# Patient Record
Sex: MALE | Race: WHITE | Employment: OTHER | URBAN - METROPOLITAN AREA
[De-identification: names, ages, dates, MRNs, and addresses within clinical notes are randomized per-mention and may not be internally consistent; named-entity substitution may affect disease eponyms.]

---

## 2017-02-28 ENCOUNTER — OFFICE VISIT (OUTPATIENT)
Dept: DERMATOLOGY | Age: 67
End: 2017-02-28

## 2017-02-28 DIAGNOSIS — L81.4 LENTIGO: ICD-10-CM

## 2017-02-28 DIAGNOSIS — L30.9 DERMATITIS: Primary | ICD-10-CM

## 2017-02-28 DIAGNOSIS — L57.0 AK (ACTINIC KERATOSIS): ICD-10-CM

## 2017-02-28 PROCEDURE — 17000 DESTRUCT PREMALG LESION: CPT | Performed by: DERMATOLOGY

## 2017-02-28 PROCEDURE — 99202 OFFICE O/P NEW SF 15 MIN: CPT | Performed by: DERMATOLOGY

## 2017-02-28 RX ORDER — OMEPRAZOLE 20 MG/1
20 CAPSULE, DELAYED RELEASE ORAL DAILY
COMMUNITY

## 2017-05-30 ENCOUNTER — OFFICE VISIT (OUTPATIENT)
Dept: DERMATOLOGY | Age: 67
End: 2017-05-30

## 2017-05-30 DIAGNOSIS — L82.1 SEBORRHEIC KERATOSIS: ICD-10-CM

## 2017-05-30 DIAGNOSIS — L30.9 DERMATITIS: Primary | ICD-10-CM

## 2017-05-30 DIAGNOSIS — D48.5 NEOPLASM OF UNCERTAIN BEHAVIOR OF SKIN: ICD-10-CM

## 2017-05-30 DIAGNOSIS — D22.9 MULTIPLE NEVI: ICD-10-CM

## 2017-05-30 PROCEDURE — 11100 PR BIOPSY OF SKIN LESION: CPT | Performed by: DERMATOLOGY

## 2017-05-30 PROCEDURE — 99212 OFFICE O/P EST SF 10 MIN: CPT | Performed by: DERMATOLOGY

## 2017-06-07 ENCOUNTER — TELEPHONE (OUTPATIENT)
Dept: DERMATOLOGY | Age: 67
End: 2017-06-07

## 2018-03-06 ENCOUNTER — OFFICE VISIT (OUTPATIENT)
Dept: DERMATOLOGY | Age: 68
End: 2018-03-06

## 2018-03-06 DIAGNOSIS — D22.9 MULTIPLE NEVI: ICD-10-CM

## 2018-03-06 DIAGNOSIS — L30.9 DERMATITIS: Primary | ICD-10-CM

## 2018-03-06 DIAGNOSIS — L57.0 AK (ACTINIC KERATOSIS): ICD-10-CM

## 2018-03-06 DIAGNOSIS — L82.1 SEBORRHEIC KERATOSES: ICD-10-CM

## 2018-03-06 DIAGNOSIS — D48.5 NEOPLASM OF UNCERTAIN BEHAVIOR OF SKIN: ICD-10-CM

## 2018-03-06 PROCEDURE — 17000 DESTRUCT PREMALG LESION: CPT | Performed by: DERMATOLOGY

## 2018-03-06 PROCEDURE — 17003 DESTRUCT PREMALG LES 2-14: CPT | Performed by: DERMATOLOGY

## 2018-03-06 PROCEDURE — 99213 OFFICE O/P EST LOW 20 MIN: CPT | Performed by: DERMATOLOGY

## 2018-03-06 NOTE — PROGRESS NOTES
[facility]  Constanza Zhang MD  [phone]      Funmilayo Torres  1950    69 y.o. male     Date of Visit: 3/6/2018    Last seen: 2-2017    Chief Complaint: f/u rash, lesion  Chief Complaint   Patient presents with    Skin Exam     History of Present Illness:    1. Here for f/u for 10+ year hx of pruritic eruption on the face, starting in 2006, waxing/waning in nature, c/w dermatitis at initial visit in 2017 and improved/cleared with use of TAC. He is extremely happy with improvement. He has had very few mild flares since last seen - clear within 1-2 days with TAC oint. Only has used a few times. Had seen multiple providers prior to seeing me and c/o that he had no improvement:  Thought to initially be rhus dermatitis 10 years ago and was given a topical medication. He subsequently was diagnosed with acne (Dr. Erik Quiroga), hives (Dr. Lawson Noble), seborrheic dermatitis (Aviva Cook) and [facility] most recently by Dr. Clarissa Harrington at [facility]. Has tried metrocream, elidel, desonide, protopic w/o significant improvement and ketoconazole (made it worse). No eruption elsewhere. 2. S/p bx of concerning brown lesion on the L temple in 2017. Benign pathology. No other changing or concerning brown lesions. 3. He has a few rough lesions - L helix, FH and scalp. Asx.     4. He has a few concerning pink lesions - chest and L shin that he was not aware of. Review of Systems:  Gen: Feels well, good sense of health. Skin: No changing moles or lesions. Past Medical History, Family History, Surgical History, Medications and Allergies reviewed. Outpatient Prescriptions Marked as Taking for the 3/6/18 encounter (Office Visit) with Nixon Yusuf MD   Medication Sig Dispense Refill    omeprazole (PRILOSEC) 20 MG delayed release capsule Take 20 mg by mouth daily      aspirin 81 MG EC tablet Take 81 mg by mouth daily.        No Known Allergies    Past Medical History:   Diagnosis Date   

## 2019-01-30 ENCOUNTER — TELEPHONE (OUTPATIENT)
Dept: DERMATOLOGY | Age: 69
End: 2019-01-30

## 2019-01-31 ENCOUNTER — OFFICE VISIT (OUTPATIENT)
Dept: DERMATOLOGY | Age: 69
End: 2019-01-31
Payer: COMMERCIAL

## 2019-01-31 DIAGNOSIS — L30.9 DERMATITIS: Primary | ICD-10-CM

## 2019-01-31 PROCEDURE — 99213 OFFICE O/P EST LOW 20 MIN: CPT | Performed by: DERMATOLOGY

## 2019-01-31 RX ORDER — RANITIDINE 300 MG/1
300 TABLET ORAL
Status: ON HOLD | COMMUNITY
End: 2021-06-28 | Stop reason: HOSPADM

## 2019-03-12 ENCOUNTER — OFFICE VISIT (OUTPATIENT)
Dept: DERMATOLOGY | Age: 69
End: 2019-03-12
Payer: COMMERCIAL

## 2019-03-12 DIAGNOSIS — D22.9 MULTIPLE NEVI: ICD-10-CM

## 2019-03-12 DIAGNOSIS — L82.1 SEBORRHEIC KERATOSES: ICD-10-CM

## 2019-03-12 DIAGNOSIS — L57.0 AK (ACTINIC KERATOSIS): ICD-10-CM

## 2019-03-12 DIAGNOSIS — L30.9 DERMATITIS: Primary | ICD-10-CM

## 2019-03-12 PROCEDURE — 17000 DESTRUCT PREMALG LESION: CPT | Performed by: DERMATOLOGY

## 2019-03-12 PROCEDURE — 99213 OFFICE O/P EST LOW 20 MIN: CPT | Performed by: DERMATOLOGY

## 2019-03-12 RX ORDER — MOMETASONE FUROATE 1 MG/G
OINTMENT TOPICAL
Qty: 45 G | Refills: 0 | Status: SHIPPED | OUTPATIENT
Start: 2019-03-12 | End: 2022-05-23 | Stop reason: SDUPTHER

## 2020-03-12 ENCOUNTER — OFFICE VISIT (OUTPATIENT)
Dept: DERMATOLOGY | Age: 70
End: 2020-03-12
Payer: COMMERCIAL

## 2020-03-12 PROCEDURE — 17000 DESTRUCT PREMALG LESION: CPT | Performed by: DERMATOLOGY

## 2020-03-12 PROCEDURE — 99213 OFFICE O/P EST LOW 20 MIN: CPT | Performed by: DERMATOLOGY

## 2020-03-12 PROCEDURE — 17003 DESTRUCT PREMALG LES 2-14: CPT | Performed by: DERMATOLOGY

## 2020-03-12 RX ORDER — ALLOPURINOL 300 MG/1
TABLET ORAL
COMMUNITY
Start: 2020-02-16

## 2020-03-12 RX ORDER — PROBENECID AND COLCHICINE 500; .5 MG/1; MG/1
1 TABLET ORAL DAILY
COMMUNITY
End: 2022-05-23

## 2020-03-12 NOTE — PROGRESS NOTES
[facility]  Yenni Luna MD  [phone]      Juan Yoo  1950    68 y.o. male     Date of Visit: 3/12/2020    Last seen: 1-2019    Chief Complaint: f/u rash, lesion  Chief Complaint   Patient presents with    Skin Exam     History of Present Illness:    1. F/u for facial dermatitis - had been flaring in early 2019 despite TAC cream.  eucrisa helped some and then tried mometasone 0.1 oint (wife's old rx) with clearance. Has only needed 1-2 applications intermittently since last seen. Using infrequently. 10+ year hx of pruritic eruption on the face, starting in 2006, waxing/waning in nature, c/w dermatitis at initial visit in 2017 and improved/cleared with use of TAC. He is extremely happy with improvement. He has had very few mild flares since 2017 except for at the end of 2018 - usually clears within 1-2 days with TAC oint. Had seen multiple providers prior to seeing me and c/o that he had no improvement:  Thought to initially be rhus dermatitis 10 years ago and was given a topical medication. He subsequently was diagnosed with acne (Dr. Pastora March), hives (Dr. Robert Bobby), seborrheic dermatitis (Saturnino Shields) and [facility] most recently by Dr. Billie Ruiz at [facility]. Has tried metrocream, elidel, desonide, protopic w/o significant improvement and ketoconazole (made it worse). No eruption elsewhere. 2. S/p bx of concerning brown lesion on the L temple in 2017. Benign pathology. No other changing or concerning brown lesions/moles. 3. He has a few new rough spots - R shoulder, vertex of the scalp. Asx. Review of Systems:  Gen: Feels well, good sense of health. Skin: No changing moles or lesions. Past Medical History, Family History, Surgical History, Medications and Allergies reviewed.     Outpatient Medications Marked as Taking for the 3/12/20 encounter (Office Visit) with Bibi Slater MD   Medication Sig Dispense Refill    allopurinol (ZYLOPRIM) 300 MG tablet TAKE 1 TABLET BY MOUTH EVERY DAY      colchicine-probenecid 0.5-500 MG per tablet Take 1 tablet by mouth daily      omeprazole (PRILOSEC) 20 MG delayed release capsule Take 20 mg by mouth daily      aspirin 81 MG EC tablet Take 81 mg by mouth daily. No Known Allergies    Past Medical History:   Diagnosis Date    Arthritis     Gout     Kidney stones      Past Surgical History:   Procedure Laterality Date    JOINT REPLACEMENT      knee replacement    KNEE ARTHROSCOPY  1986    TOTAL KNEE ARTHROPLASTY  2006       Physical Examination     Gen, well-appearing  The following were examined and determined to be normal: Psych/Neuro, Conjunctivae/eyelids, Gums/teeth/lips, Neck, Breast/axilla/chest, Abdomen, Back, RUE, LUE, RLE, LLE, Nails/digits and buttocks. The following were examined and determined to be abnormal: Head/face , scalp    R shoulder and ertex scalp with erythematous roughly scaled macules  No dermatitis  L temple with tan/brown macule with central scar  Trunk, arms with scattered brown macules and papules  Near elbows - 3 mobile 5-6 mm cystic papules    Assessment and Plan     1. Dermatitis - lower medial cheeks, most c/w seb derm or irritant dermatitis  - improved and now clear  - mometasone or TAC oint daily - bid prn flares; ed se/misuse  - again reviewed waxing/waning nature and may have future flares but medication should shorten duration of flares and help with sx; avoid frequent/long-term use of TAC/mometasone    2. Benign-appearing nevi and SK's and few small epidermoid cysts on the R elbow  - educ re ABCD's of MM   educ sun protection   encouraged skin check yearly (sooner if indicated), self checks    3. AK's - R shoulder, vertex of the scalp x 3 - 4 total  - 4 lesion(s) on the above areas treated with liquid nitrogen x 2 cycles. Patient educated on risk of blister, hypopigmentation/scar and wound care.      ? LK vs BCC - L anterior shin and L medial pectoral area - resolved spontaneously    F/u 12 mos.

## 2021-03-23 ENCOUNTER — OFFICE VISIT (OUTPATIENT)
Dept: DERMATOLOGY | Age: 71
End: 2021-03-23
Payer: COMMERCIAL

## 2021-03-23 VITALS — TEMPERATURE: 97 F

## 2021-03-23 DIAGNOSIS — L82.1 SEBORRHEIC KERATOSES: ICD-10-CM

## 2021-03-23 DIAGNOSIS — L81.4 LENTIGO: ICD-10-CM

## 2021-03-23 DIAGNOSIS — L57.0 AK (ACTINIC KERATOSIS): ICD-10-CM

## 2021-03-23 DIAGNOSIS — L30.9 DERMATITIS: Primary | ICD-10-CM

## 2021-03-23 DIAGNOSIS — D22.9 MULTIPLE NEVI: ICD-10-CM

## 2021-03-23 PROCEDURE — 17000 DESTRUCT PREMALG LESION: CPT | Performed by: DERMATOLOGY

## 2021-03-23 PROCEDURE — 17003 DESTRUCT PREMALG LES 2-14: CPT | Performed by: DERMATOLOGY

## 2021-03-23 PROCEDURE — 99213 OFFICE O/P EST LOW 20 MIN: CPT | Performed by: DERMATOLOGY

## 2021-03-23 RX ORDER — MULTIVIT-MIN/IRON/FOLIC ACID/K 18-600-40
CAPSULE ORAL
COMMUNITY

## 2021-03-23 NOTE — PROGRESS NOTES
[facility]  Roseann Deluca MD  [phone]      Berta Estevez  1950    65 y.o. male     Date of Visit: 3/23/2021    Last seen: 3-2020    Chief Complaint: f/u rash, lesion  Chief Complaint   Patient presents with    Skin Lesion     History of Present Illness:  building a new house in 2021    1. F/u for facial dermatitis - had been flaring in early 2019 despite TAC cream.  eucrisa helped some and then tried mometasone 0.1 oint (wife's old rx) with clearance. Has only needed 1-2 applications intermittently in the past few years. Hasn't used medication recently. Previous hx: 10+ year hx of pruritic eruption on the face, starting in 2006, waxing/waning in nature, c/w dermatitis at initial visit in 2017 and improved/cleared with use of TAC. Was extremely happy with improvement. Had very few mild flares since 2017  - clears within 1-2 days with TAC oint. One more significant flare in 2019 as above - cleared with mometasone. Had seen multiple providers prior to seeing me and c/o that he had no improvement:  Thought to initially be rhus dermatitis 10 years ago and was given a topical medication. He subsequently was diagnosed with acne (Dr. Pamela Wills), hives (Dr. Eric Samuels), seborrheic dermatitis (Yonathan Conrad) and [facility] most recently by Dr. Hilaria Rosales at [facility]. Has tried metrocream, elidel, desonide, protopic w/o significant improvement and ketoconazole (made it worse). No eruption elsewhere. 2. S/p bx of concerning brown lesion on the L temple in 2017. Benign pathology - solar lentigo. Still present but no changes noted. No other changing or concerning brown lesions/moles. 3.  He has a few new rough spots - FH/frontal scalp and ear. Review of Systems:  Gen: Feels well, good sense of health. Skin: No changing moles or lesions. Past Medical History, Family History, Surgical History, Medications and Allergies reviewed.     Outpatient Medications Marked as Taking for the 3/23/21 encounter (Office Visit) with Deboraha Bosworth, MD   Medication Sig Dispense Refill    Cholecalciferol (VITAMIN D) 50 MCG (2000 UT) CAPS capsule Take by mouth      allopurinol (ZYLOPRIM) 300 MG tablet TAKE 1 TABLET BY MOUTH EVERY DAY      mometasone (ELOCON) 0.1 % ointment Apply topically daily. 45 g 0    omeprazole (PRILOSEC) 20 MG delayed release capsule Take 20 mg by mouth daily      triamcinolone (KENALOG) 0.1 % ointment Apply to affected area BID PRN flares; avoid prolonged use on the face. 30 g 0    aspirin 81 MG EC tablet Take 81 mg by mouth daily. No Known Allergies    Past Medical History:   Diagnosis Date    Arthritis     Gout     Kidney stones      Past Surgical History:   Procedure Laterality Date    JOINT REPLACEMENT      knee replacement    KNEE ARTHROSCOPY  1986    TOTAL KNEE ARTHROPLASTY  2006       Physical Examination     Gen, well-appearing  The following were examined and determined to be normal: Psych/Neuro, Conjunctivae/eyelids, Gums/teeth/lips, Neck, Breast/axilla/chest, Abdomen, Back, RUE, LUE, RLE, LLE, Nails/digits and buttocks. The following were examined and determined to be abnormal: Head/face , scalp    L helix and frontal scalp/FH with erythematous roughly scaled macules  No dermatitis  L temple with tan/brown mottled macule/patch with focal scar  Trunk, arms with scattered brown macules and papules            Assessment and Plan     1. Dermatitis - lower medial cheeks, most c/w seb derm or irritant dermatitis  - improved and remains clear  - no trx currently  - resume mometasone or TAC oint daily - bid short-term prn flares; ed se/misuse    2a. Benign-appearing nevi and SK's   2b. L temple - lentigo - bx 2017 - stable  - educ re si/sx/ABCD's of MM   educ sun protection - OTC sunscreen with SPF 30-50+ recommended and reviewed usage  encouraged skin check yearly (sooner if indicated), self checks  *monitor lesion on the L temple (bx 2017)    3.  AK's - frontal scalp/upper FH and L helix  - 3 lesion(s) on the above areas treated with liquid nitrogen x 2 cycles. Patient educated on risk of blister, hypopigmentation/scar and wound care. F/u 12 mos.

## 2021-06-27 ENCOUNTER — APPOINTMENT (OUTPATIENT)
Dept: GENERAL RADIOLOGY | Age: 71
DRG: 871 | End: 2021-06-27
Payer: MEDICARE

## 2021-06-27 ENCOUNTER — HOSPITAL ENCOUNTER (INPATIENT)
Age: 71
LOS: 1 days | Discharge: HOME | DRG: 871 | End: 2021-06-28
Attending: EMERGENCY MEDICINE | Admitting: INTERNAL MEDICINE
Payer: MEDICARE

## 2021-06-27 DIAGNOSIS — J18.9 PNEUMONIA OF RIGHT MIDDLE LOBE DUE TO INFECTIOUS ORGANISM: ICD-10-CM

## 2021-06-27 DIAGNOSIS — A41.9 SEPTICEMIA (HCC): Primary | ICD-10-CM

## 2021-06-27 LAB
A/G RATIO: 1.6 (ref 1.1–2.2)
ALBUMIN SERPL-MCNC: 4.4 G/DL (ref 3.4–5)
ALP BLD-CCNC: 72 U/L (ref 40–129)
ALT SERPL-CCNC: 23 U/L (ref 10–40)
ANION GAP SERPL CALCULATED.3IONS-SCNC: 10 MMOL/L (ref 3–16)
AST SERPL-CCNC: 35 U/L (ref 15–37)
BASOPHILS ABSOLUTE: 0 K/UL (ref 0–0.2)
BASOPHILS RELATIVE PERCENT: 0 %
BILIRUB SERPL-MCNC: 0.9 MG/DL (ref 0–1)
BILIRUBIN URINE: NEGATIVE
BLOOD, URINE: NEGATIVE
BUN BLDV-MCNC: 23 MG/DL (ref 7–20)
CALCIUM SERPL-MCNC: 9.9 MG/DL (ref 8.3–10.6)
CHLORIDE BLD-SCNC: 101 MMOL/L (ref 99–110)
CLARITY: CLEAR
CO2: 26 MMOL/L (ref 21–32)
COLOR: YELLOW
CREAT SERPL-MCNC: 1.5 MG/DL (ref 0.8–1.3)
EOSINOPHILS ABSOLUTE: 0 K/UL (ref 0–0.6)
EOSINOPHILS RELATIVE PERCENT: 0 %
GFR AFRICAN AMERICAN: 56
GFR NON-AFRICAN AMERICAN: 46
GLOBULIN: 2.7 G/DL
GLUCOSE BLD-MCNC: 110 MG/DL (ref 70–99)
GLUCOSE URINE: NEGATIVE MG/DL
HCT VFR BLD CALC: 48.5 % (ref 40.5–52.5)
HEMOGLOBIN: 16.5 G/DL (ref 13.5–17.5)
KETONES, URINE: NEGATIVE MG/DL
LACTIC ACID, SEPSIS: 1.8 MMOL/L (ref 0.4–1.9)
LEUKOCYTE ESTERASE, URINE: NEGATIVE
LIPASE: 65 U/L (ref 13–60)
LYMPHOCYTES ABSOLUTE: 1.2 K/UL (ref 1–5.1)
LYMPHOCYTES RELATIVE PERCENT: 4 %
MCH RBC QN AUTO: 33.1 PG (ref 26–34)
MCHC RBC AUTO-ENTMCNC: 34 G/DL (ref 31–36)
MCV RBC AUTO: 97.3 FL (ref 80–100)
MICROSCOPIC EXAMINATION: NORMAL
MONOCYTES ABSOLUTE: 1.8 K/UL (ref 0–1.3)
MONOCYTES RELATIVE PERCENT: 6 %
NEUTROPHILS ABSOLUTE: 26.3 K/UL (ref 1.7–7.7)
NEUTROPHILS RELATIVE PERCENT: 90 %
NITRITE, URINE: NEGATIVE
PDW BLD-RTO: 13.3 % (ref 12.4–15.4)
PH UA: 6.5 (ref 5–8)
PLATELET # BLD: 280 K/UL (ref 135–450)
PLATELET SLIDE REVIEW: ADEQUATE
PMV BLD AUTO: 7.2 FL (ref 5–10.5)
POTASSIUM SERPL-SCNC: 4.6 MMOL/L (ref 3.5–5.1)
PRO-BNP: 85 PG/ML (ref 0–124)
PROCALCITONIN: 14.61 NG/ML (ref 0–0.15)
PROTEIN UA: NEGATIVE MG/DL
RBC # BLD: 4.99 M/UL (ref 4.2–5.9)
RBC # BLD: NORMAL 10*6/UL
SLIDE REVIEW: ABNORMAL
SODIUM BLD-SCNC: 137 MMOL/L (ref 136–145)
SPECIFIC GRAVITY UA: 1.01 (ref 1–1.03)
TOTAL PROTEIN: 7.1 G/DL (ref 6.4–8.2)
TROPONIN: <0.01 NG/ML
URINE REFLEX TO CULTURE: NORMAL
URINE TYPE: NORMAL
UROBILINOGEN, URINE: 0.2 E.U./DL
WBC # BLD: 29.2 K/UL (ref 4–11)

## 2021-06-27 PROCEDURE — 71045 X-RAY EXAM CHEST 1 VIEW: CPT

## 2021-06-27 PROCEDURE — 6360000002 HC RX W HCPCS: Performed by: INTERNAL MEDICINE

## 2021-06-27 PROCEDURE — 85025 COMPLETE CBC W/AUTO DIFF WBC: CPT

## 2021-06-27 PROCEDURE — 80053 COMPREHEN METABOLIC PANEL: CPT

## 2021-06-27 PROCEDURE — 83880 ASSAY OF NATRIURETIC PEPTIDE: CPT

## 2021-06-27 PROCEDURE — 96360 HYDRATION IV INFUSION INIT: CPT

## 2021-06-27 PROCEDURE — 93005 ELECTROCARDIOGRAM TRACING: CPT | Performed by: PHYSICIAN ASSISTANT

## 2021-06-27 PROCEDURE — G0378 HOSPITAL OBSERVATION PER HR: HCPCS

## 2021-06-27 PROCEDURE — 2580000003 HC RX 258: Performed by: PHYSICIAN ASSISTANT

## 2021-06-27 PROCEDURE — 96361 HYDRATE IV INFUSION ADD-ON: CPT

## 2021-06-27 PROCEDURE — 87040 BLOOD CULTURE FOR BACTERIA: CPT

## 2021-06-27 PROCEDURE — 83690 ASSAY OF LIPASE: CPT

## 2021-06-27 PROCEDURE — 96375 TX/PRO/DX INJ NEW DRUG ADDON: CPT

## 2021-06-27 PROCEDURE — 1200000000 HC SEMI PRIVATE

## 2021-06-27 PROCEDURE — 81003 URINALYSIS AUTO W/O SCOPE: CPT

## 2021-06-27 PROCEDURE — 83605 ASSAY OF LACTIC ACID: CPT

## 2021-06-27 PROCEDURE — 96372 THER/PROPH/DIAG INJ SC/IM: CPT

## 2021-06-27 PROCEDURE — 36415 COLL VENOUS BLD VENIPUNCTURE: CPT

## 2021-06-27 PROCEDURE — 6360000002 HC RX W HCPCS: Performed by: PHYSICIAN ASSISTANT

## 2021-06-27 PROCEDURE — 2580000003 HC RX 258: Performed by: INTERNAL MEDICINE

## 2021-06-27 PROCEDURE — 99283 EMERGENCY DEPT VISIT LOW MDM: CPT

## 2021-06-27 PROCEDURE — 84145 PROCALCITONIN (PCT): CPT

## 2021-06-27 PROCEDURE — 96365 THER/PROPH/DIAG IV INF INIT: CPT

## 2021-06-27 PROCEDURE — 96367 TX/PROPH/DG ADDL SEQ IV INF: CPT

## 2021-06-27 PROCEDURE — 84484 ASSAY OF TROPONIN QUANT: CPT

## 2021-06-27 RX ORDER — 0.9 % SODIUM CHLORIDE 0.9 %
1000 INTRAVENOUS SOLUTION INTRAVENOUS ONCE
Status: COMPLETED | OUTPATIENT
Start: 2021-06-27 | End: 2021-06-27

## 2021-06-27 RX ORDER — ALBUTEROL SULFATE 2.5 MG/3ML
2.5 SOLUTION RESPIRATORY (INHALATION) EVERY 6 HOURS PRN
Status: DISCONTINUED | OUTPATIENT
Start: 2021-06-27 | End: 2021-06-28 | Stop reason: HOSPADM

## 2021-06-27 RX ORDER — SODIUM CHLORIDE 0.9 % (FLUSH) 0.9 %
5-40 SYRINGE (ML) INJECTION EVERY 12 HOURS SCHEDULED
Status: DISCONTINUED | OUTPATIENT
Start: 2021-06-27 | End: 2021-06-28 | Stop reason: HOSPADM

## 2021-06-27 RX ORDER — SODIUM CHLORIDE 0.9 % (FLUSH) 0.9 %
5-40 SYRINGE (ML) INJECTION PRN
Status: DISCONTINUED | OUTPATIENT
Start: 2021-06-27 | End: 2021-06-28 | Stop reason: HOSPADM

## 2021-06-27 RX ORDER — ACETAMINOPHEN 650 MG/1
650 SUPPOSITORY RECTAL EVERY 6 HOURS PRN
Status: DISCONTINUED | OUTPATIENT
Start: 2021-06-27 | End: 2021-06-28 | Stop reason: HOSPADM

## 2021-06-27 RX ORDER — SODIUM CHLORIDE 9 MG/ML
25 INJECTION, SOLUTION INTRAVENOUS PRN
Status: DISCONTINUED | OUTPATIENT
Start: 2021-06-27 | End: 2021-06-28 | Stop reason: HOSPADM

## 2021-06-27 RX ORDER — SODIUM CHLORIDE 9 MG/ML
INJECTION, SOLUTION INTRAVENOUS
Status: DISPENSED
Start: 2021-06-27 | End: 2021-06-28

## 2021-06-27 RX ORDER — ASPIRIN 81 MG/1
81 TABLET, CHEWABLE ORAL DAILY
Status: DISCONTINUED | OUTPATIENT
Start: 2021-06-28 | End: 2021-06-28 | Stop reason: HOSPADM

## 2021-06-27 RX ORDER — ALLOPURINOL 300 MG/1
300 TABLET ORAL DAILY
Status: DISCONTINUED | OUTPATIENT
Start: 2021-06-28 | End: 2021-06-28 | Stop reason: HOSPADM

## 2021-06-27 RX ORDER — PANTOPRAZOLE SODIUM 40 MG/10ML
40 INJECTION, POWDER, LYOPHILIZED, FOR SOLUTION INTRAVENOUS DAILY
Status: DISCONTINUED | OUTPATIENT
Start: 2021-06-28 | End: 2021-06-28 | Stop reason: HOSPADM

## 2021-06-27 RX ORDER — IPRATROPIUM BROMIDE AND ALBUTEROL SULFATE 2.5; .5 MG/3ML; MG/3ML
1 SOLUTION RESPIRATORY (INHALATION) EVERY 4 HOURS PRN
Status: DISCONTINUED | OUTPATIENT
Start: 2021-06-27 | End: 2021-06-28 | Stop reason: HOSPADM

## 2021-06-27 RX ORDER — ACETAMINOPHEN 325 MG/1
650 TABLET ORAL EVERY 6 HOURS PRN
Status: DISCONTINUED | OUTPATIENT
Start: 2021-06-27 | End: 2021-06-28 | Stop reason: HOSPADM

## 2021-06-27 RX ADMIN — ENOXAPARIN SODIUM 40 MG: 40 INJECTION SUBCUTANEOUS at 16:27

## 2021-06-27 RX ADMIN — PIPERACILLIN AND TAZOBACTAM 3375 MG: 3; .375 INJECTION, POWDER, LYOPHILIZED, FOR SOLUTION INTRAVENOUS at 16:29

## 2021-06-27 RX ADMIN — AZITHROMYCIN MONOHYDRATE 500 MG: 500 INJECTION, POWDER, LYOPHILIZED, FOR SOLUTION INTRAVENOUS at 14:13

## 2021-06-27 RX ADMIN — Medication 1000 MG: at 14:09

## 2021-06-27 RX ADMIN — SODIUM CHLORIDE 1000 ML: 9 INJECTION, SOLUTION INTRAVENOUS at 12:08

## 2021-06-27 RX ADMIN — SODIUM CHLORIDE 1000 ML: 9 INJECTION, SOLUTION INTRAVENOUS at 14:58

## 2021-06-27 RX ADMIN — SODIUM CHLORIDE 25 ML: 9 INJECTION, SOLUTION INTRAVENOUS at 16:27

## 2021-06-27 ASSESSMENT — ENCOUNTER SYMPTOMS
VOMITING: 0
SHORTNESS OF BREATH: 0
ABDOMINAL PAIN: 0
COUGH: 1
DIARRHEA: 0
RHINORRHEA: 0
NAUSEA: 0

## 2021-06-27 ASSESSMENT — PAIN SCALES - GENERAL
PAINLEVEL_OUTOF10: 0
PAINLEVEL_OUTOF10: 0

## 2021-06-27 NOTE — ED NOTES
Pt discharged home, verbalized discharge instructions. Ambulated independently to exit without difficulty.        Mary Lomax RN  06/27/21 2436

## 2021-06-27 NOTE — ED PROVIDER NOTES
[facility] Emergency Department      Pt Name: Chente Vizcaino  MRN: [id number]  [place] 1950  Date of evaluation: 6/27/2021  Provider: Krystle Thorne MD  I independently performed a history and physical on Chente Vizcaino. All diagnostic, treatment, and disposition decisions were made by myself in conjunction with the advanced practice provider. HPI: Chente Vizcaino presented with   Chief Complaint   Patient presents with    Other     patient states he felt like he had acid reflux last night, took his omeprazole, this AM woke up with \"uncontrollable\" shaking, was tachycardic in the 130's this AM. feeling dizzy and hot     Chente Vizcaino has a past medical history of Arthritis, Gout, and Kidney stones. He has a past surgical history that includes Knee arthroscopy (1986); Total knee arthroplasty (2006); and joint replacement. No current facility-administered medications on file prior to encounter. Current Outpatient Medications on File Prior to Encounter   Medication Sig Dispense Refill    Cholecalciferol (VITAMIN D) 50 MCG (2000 UT) CAPS capsule Take by mouth      allopurinol (ZYLOPRIM) 300 MG tablet TAKE 1 TABLET BY MOUTH EVERY DAY      omeprazole (PRILOSEC) 20 MG delayed release capsule Take 20 mg by mouth daily      aspirin 81 MG EC tablet Take 81 mg by mouth daily.  colchicine-probenecid 0.5-500 MG per tablet Take 1 tablet by mouth daily      mometasone (ELOCON) 0.1 % ointment Apply topically daily. 45 g 0    ranitidine (ZANTAC) 300 MG tablet Take 300 mg by mouth      triamcinolone (KENALOG) 0.1 % ointment Apply to affected area BID PRN flares; avoid prolonged use on the face.  30 g 0     PHYSICAL EXAM  Vitals: BP (!) 146/73   Pulse 61   Temp 97.7 °F (36.5 °C) (Oral)   Resp 20   Ht 5' 6\" (1.676 m)   Wt 174 lb (78.9 kg)   SpO2 96%   BMI 28.08 kg/m²   Constitutional:  65 y.o. male alert, cooperative  HENT:  Atraumatic scalp, mucous membranes moist  Eyes:   Conjunctiva clear, no icterus  Neck:  Supple, no visible JVD, no signs of injury  Cardiovascular:  Regular, no rubs  Thorax & Lungs:  No accessory muscle usage, right sided rales  Abdomen:  Soft, non distended, NT  Back:  No deformity  Genitalia:  Deferred  Rectal:  Deferred  Extremities:  No cyanosis, no edema, adequate perfusion  Skin:  Warm, dry  Neurologic:  Alert, no slurred speech  Psychiatric:  Affect appropriate    Medical Decision Making and Plan:  Briefly, this is an 65 y.o.male who presented with concern for shakes, cough. Found to have pneumonia with sepsis. He is oxygenating adequately. We have given him broad-spectrum antibiotic coverage. He has had vaccinations for Covid. Plan is to admit for further care. For further details of [facility] encounter, please see documentation by advanced practice provider RISHABH Melendez.     Labs Reviewed   CBC WITH AUTO DIFFERENTIAL - Abnormal; Notable for the following components:       Result Value    WBC 29.2 (*)     Neutrophils Absolute 26.3 (*)     Monocytes Absolute 1.8 (*)     All other components within normal limits    Narrative:     Performed at:                  [facility] [address], [place], [facility]   Phone [phone]   COMPREHENSIVE METABOLIC PANEL - Abnormal; Notable for the following components:    Glucose 110 (*)     BUN 23 (*)     CREATININE 1.5 (*)     GFR Non- 46 (*)     GFR  56 (*)     All other components within normal limits    Narrative:     Performed at:                  [facility]                  [address] [facility] [employer]   Phone [phone]   LIPASE - Abnormal; Notable for the following components:    Lipase 65.0 (*)     All other components within normal limits    Narrative:     Performed at:                  [facility]                  [address],  [place], [address]   Phone [phone]   PROCALCITONIN - Abnormal; Notable for the following components:    Procalcitonin 14.61 (*)     All other components within normal limits    Narrative:     Performed at:                  [facility] [address] [employer],  [place], [address]   Phone [phone]   CULTURE, BLOOD 2   CULTURE, BLOOD 1   CULTURE, RESPIRATORY   TROPONIN    Narrative:     Performed at:                  [facility] [address] [employer], [address]   Phone [phone]   BRAIN NATRIURETIC PEPTIDE    Narrative:     Performed at:                  [facility] [address] [place], [employer]   Phone [phone]   URINE RT REFLEX TO CULTURE    Narrative:     Performed at:                  [facility] [address], [address]   Phone [phone]   LACTATE, SEPSIS    Narrative:     Performed at:                  [facility] [address] [employer], [employer]   Phone [phone]     RADIOLOGY:     Plain x-rays were viewed by me:   XR CHEST PORTABLE   Final Result   New right upper lobe airspace disease appearance most suggestive of pneumonia   in the acute setting although nonspecific and follow-up PA and lateral view   chest x-ray recommended in 2 weeks after treatment for pneumonia. EKG:  Read by me in the absence of a cardiologist shows: Sinus tachycardia, rate 104, intervals normal, axis IX degrees, no ST elevation, prior EKG not available    CRITICAL CARE:   Total critical care time of 31 minutes (excludes any time for procedures).   This includes but not limited to vital sign monitoring, medication, clinical response to medications, interpretation of diagnostics, review of nursing notes, pertinent record review, discussions about patient condition, consultation time, documentation time. Critical care due to the patient's sepsis.     SEP-1 CORE MEASURE DATA  Classification: sepsis  Amount of fluids ordered: at least 30mL/kg based on entered actual weight at time of triage  Time at which sepsis was identified: 1400  Broad-spectrum antibiotics chosen: rocephin, zithromax, zosyn based on sepsis order-set for a suspected source of: Pulmonary - Community Acquired  Repeat lactate level: not indicated  On reassessment after treatment:  Updated vital signs: BP (!) 146/73   Pulse 61   Temp 97.7 °F (36.5 °C) (Oral)   Resp 20   Ht 5' 6\" (1.676 m)   Wt 174 lb (78.9 kg)   SpO2 96%   BMI 28.08 kg/m²   Cardiopulmonary examination: regular, relaxed wob, Capillary refill: brisk, Peripheral pulses: good Skin examination: warm     Vitals:    06/27/21 1445 06/27/21 1500 06/27/21 1515 06/27/21 1538   BP: 117/74 114/74 115/76 (!) 146/73   Pulse: 64 64 65 61   Resp: 26 21 23 20   Temp:    97.7 °F (36.5 °C)   TempSrc:    Oral   SpO2:    96%   Weight:    174 lb (78.9 kg)   Height:    5' 6\" (1.676 m)     Medications administered:  Medications   sodium chloride flush 0.9 % injection 5-40 mL (has no administration in time range)   sodium chloride flush 0.9 % injection 5-40 mL (has no administration in time range)   0.9 % sodium chloride infusion (has no administration in time range)   acetaminophen (TYLENOL) tablet 650 mg (has no administration in time range)     Or   acetaminophen (TYLENOL) suppository 650 mg (has no administration in time range)   enoxaparin (LOVENOX) injection 40 mg (has no administration in time range)   piperacillin-tazobactam (ZOSYN) 3,375 mg in dextrose 5 % 50 mL IVPB (mini-bag) (has no administration in time range)   allopurinol (ZYLOPRIM) tablet 300 mg (has no administration in time range)   aspirin chewable tablet 81 mg (has no administration in time range)   pantoprazole (PROTONIX) injection 40 mg (has no administration in time range)   albuterol (PROVENTIL) nebulizer solution 2.5 mg (has no administration in time range)   ipratropium-albuterol (DUONEB) nebulizer solution 1 ampule (has no administration in time range)   0.9 % sodium chloride bolus (0 mLs Intravenous Stopped 6/27/21 1328)   0.9 % sodium chloride bolus (0 mLs Intravenous Stopped 6/27/21 1600)   cefTRIAXone (ROCEPHIN) 1000 mg in sterile water 10 mL IV syringe (1,000 mg Intravenous Given 6/27/21 1409)   azithromycin (ZITHROMAX) 500 mg in D5W 250ml Vial Mate (0 mg Intravenous Stopped 6/27/21 1527)     FINAL IMPRESSION:    1. Septicemia ([facility] [address]    2.  Pneumonia of right middle lobe due to infectious organism       DISPOSITION Admitted 06/27/2021 02:01:58 PM       Nimco Caballero MD  06/27/21 5935

## 2021-06-27 NOTE — ED NOTES
Bed: 06  Expected date:   Expected time:   Means of arrival:   Comments:  Soo Coyle.       Kaya Warren RN  06/27/21 5574

## 2021-06-27 NOTE — PROGRESS NOTES
.4 Eyes Skin Assessment     NAME:  Christy Khoury  YOB: 1950  MEDICAL RECORD NUMBER:  [id number]    The patient is being assess for  Admission    I agree that 2 RN's have performed a thorough Head to Toe Skin Assessment on the patient. ALL assessment sites listed below have been assessed. Areas assessed by both nurses:    Head, Face, Ears, Shoulders, Back, Chest, Arms, Elbows, Hands, Sacrum. Buttock, Coccyx, Ischium and Legs. Feet and Heels        Does the Patient have a Wound?  No noted wound(s)       Pino Prevention initiated:  NA   Wound Care Orders initiated:  No    Pressure Injury (Stage 3,4, Unstageable, DTI, NWPT, and Complex wounds) if present place consult order under [de-identified] No    New and Established Ostomies if present place consult order under : No      Nurse 1 eSignature: Electronically signed by Ching Mckeon RN on 6/27/21 at 4:48 PM EDT    **SHARE this note so that the co-signing nurse is able to place an eSignature**    Nurse 2 eSignature: Electronically signed by Aaron Gil RN on 6/27/21 at 5:07 PM EDT

## 2021-06-27 NOTE — PROGRESS NOTES
Pt transported from ED to unit alongside wife. Pt alert, oriented and in stable condition. Vitals obtained. Pt oriented to room and educated on how to use call light, pt verbalized understanding. Pt denies pain at time and expresses no further needs. Call light in reach. Pt independent in room.

## 2021-06-27 NOTE — PLAN OF CARE
Advance Care Planning Note       Purpose of Encounter: Advanced care planning in light of hospitalization for  sepsis   Parties in attendance: :Lyndon Villanueva MD, WIFE   Decisional Capacity:Yes  Objective: Sepsis secondary to pneumonia  Goals of Care Determinations: Patient wants full support including CPR, intubation, trach, PEG tube, dialysis   Code Status: Full   Time Spent on Advanced Planning Documents: 16 mins. Advanced Care Planning Documents:  Completed advances directives, advanced directives in chart.       Electronically signed by iNcola Galaviz MD on 6/27/2021 at 5:53 PM

## 2021-06-27 NOTE — H&P
HOSPITALISTS HISTORY AND PHYSICAL    6/27/2021 2:56 PM    Patient Information:  Linda Shields is a 65 y.o. male [id number]  PCP:  Felisha Juarez MD (Tel: [phone] )    Chief complaint:    Chief Complaint   Patient presents with    Other     patient states he felt like he had acid reflux last night, took his omeprazole, this AM woke up with \"uncontrollable\" shaking, was tachycardic in the 130's this AM. feeling dizzy and hot        History of Present Illness:  Jerrod Ryan is a 65 y.o. male  with PMHx of gout, GERD and arthritis presented to the ED for the evaluation of shaking chills and tachycardia. Patient reported that yesterday evening he ate ice cream around 8 PM after that felt like there was something stuck in his throat and was not going down and started coughing. At night, it was difficult for him to lay down flat and had retrosternal burning sensation which he thought that he was having GERD symptoms and took omeprazole. Patient went to bed but woke up around 3:30 AM a.m. with severe shaking chills, palpitations, feeling hot. Patient reported that he had a temp of 99 at that time and was satting around 88- 89%. Patient stated that the shaking episode continued off and on until 4:30 AM and  he decided to come to the hospital for further evaluation. Patient denied any chest pain, shortness of breath, abdominal pain, nausea, vomiting, diarrhea, any new medication, any recent travel or sick contact. Patient has been vaccinated with COVID-19. On admission, patient was afebrile but  tachycardic. Initial diagnostic lab work showed WBC elevated to 29.2, BUN of 23, creatinine of 1.5, procalcitonin: 14.6. Chest x-ray showed new right upper  lobe airspace disease. Patient was given IV fluids according to sepsis protocol and received Rocephin and Zithromax.       REVIEW OF SYSTEMS: Detailed 12 point ROS obtained which were negative except what mentioned above in HPI    Past Medical History:   has a past medical history of Arthritis, Gout, and Kidney stones. Past Surgical History:   has a past surgical history that includes Knee arthroscopy (1986); Total knee arthroplasty (2006); and joint replacement. Medications:  No current facility-administered medications on file prior to encounter. Current Outpatient Medications on File Prior to Encounter   Medication Sig Dispense Refill    Cholecalciferol (VITAMIN D) 50 MCG (2000 UT) CAPS capsule Take by mouth      allopurinol (ZYLOPRIM) 300 MG tablet TAKE 1 TABLET BY MOUTH EVERY DAY      omeprazole (PRILOSEC) 20 MG delayed release capsule Take 20 mg by mouth daily      aspirin 81 MG EC tablet Take 81 mg by mouth daily.  colchicine-probenecid 0.5-500 MG per tablet Take 1 tablet by mouth daily      mometasone (ELOCON) 0.1 % ointment Apply topically daily. 45 g 0    ranitidine (ZANTAC) 300 MG tablet Take 300 mg by mouth      triamcinolone (KENALOG) 0.1 % ointment Apply to affected area BID PRN flares; avoid prolonged use on the face. 30 g 0       Allergies:  No Known Allergies     Social History:   reports that he has never smoked. He has never used smokeless tobacco. He reports that he does not drink alcohol and does not use drugs. Family History:  family history includes Cancer in his brother; Heart Failure in his father; Hypertension in his brother and brother; Stroke in his brother. Physical Exam:  /71   Pulse 64   Temp 98.3 °F (36.8 °C)   Resp 24   SpO2 98%     General appearance: No apparent distress appears stated age and cooperative. HEENT Normal cephalic, atraumatic without obvious deformity. Pupils equal, round, and reactive to light. Extra ocular muscles intact. Conjunctivae/corneas clear. Neck: Supple, No jugular venous distention/bruits.   Trachea midline without thyromegaly or adenopathy with full range of motion. Lungs: Scattered Crackles over the right middle and lower lobe  Heart: Regular rate and rhythm with Normal S1/S2 without murmurs, rubs or gallops, point of maximum impulse non-displaced  Abdomen: Soft, non-tender or non-distended without rigidity or guarding and positive bowel sounds all four quadrants. Extremities: No clubbing, cyanosis, or edema bilaterally. Full range of motion without deformity and normal gait intact. Skin: Skin color, texture, turgor normal.  No rashes or lesions. Neurologic: Alert and oriented X 3, neurovascularly intact with sensory/motor intact upper extremities/lower extremities, bilaterally. Cranial nerves: II-XII intact, grossly non-focal.  Psychiatry: Appropriate affect. Not agitated  Skin: Warm, dry, normal turgor, no rash  Brisk capillary refill, peripheral pulses palpable     Labs:  CBC:   Lab Results   Component Value Date    WBC 29.2 06/27/2021    RBC 4.99 06/27/2021    HGB 16.5 06/27/2021    HCT 48.5 06/27/2021    MCV 97.3 06/27/2021    MCH 33.1 06/27/2021    MCHC 34.0 06/27/2021    RDW 13.3 06/27/2021     06/27/2021    MPV 7.2 06/27/2021     BMP:    Lab Results   Component Value Date     06/27/2021    K 4.6 06/27/2021     06/27/2021    CO2 26 06/27/2021    BUN 23 06/27/2021    CREATININE 1.5 06/27/2021    CALCIUM 9.9 06/27/2021    GFRAA 56 06/27/2021    GFRAA >60 04/07/2013    LABGLOM 46 06/27/2021    GLUCOSE 110 06/27/2021     XR CHEST PORTABLE   Final Result   New right upper lobe airspace disease appearance most suggestive of pneumonia   in the acute setting although nonspecific and follow-up PA and lateral view   chest x-ray recommended in 2 weeks after treatment for pneumonia. Discussed case  with ED physician    Problem List  Active Problems:    Sepsis [facility])  Resolved Problems:    * No resolved hospital problems.  *        Assessment/Plan:     Sepsis likely secondary to pneumonia  afebrile but tachycardic admission  WBC elevated to 29.2, procalcitonin: 14.6, lactic acid within normal limits  Chest x-ray showed new right upper  lobe airspace disease. Blood and sputum culture ordered  Status post fluid resuscitation according to sepsis protocol in the ED  Received azithromycin and Rocephin  Started on Zosyn  RT evaluate and treat  Supplement relaxation if needed  Continue as needed breathing treatments  Monitor on telemetry    Right-sided pneumonia:   Treatment as above    SUSANNA likely secondary to sepsis  Serum creatinine 1.5 on admission; no baseline recorded  Continue IV fluids  Avoid nephrotoxins    GERD: Continue Protonix    Gout: Continue allopurinol      DVT prophylaxis: Lovenox  Code status: Full  Diet: Regular    Admit as inpatient/Observation I anticipate hospitalization spanning less/more than two midnights for investigation and treatment of the above medically necessary diagnoses. Please note that some part of this chart was generated using Dragon dictation software. Although every effort was made to ensure the accuracy of this automated transcription, some errors in transcription may have occurred inadvertently. If you may need any clarification, please do not hesitate to contact me through [facility].        Roque Anglin MD    6/27/2021 2:56 PM

## 2021-06-27 NOTE — ED PROVIDER NOTES
[facility]        Pt Name: Stella Nunez  MRN: [id number]  Cartergfbrenda 1950  Date of evaluation: 6/27/2021  Provider: Roland Montgomery PA-C  PCP: Kevin Holland MD  Note Started: 2:55 PM EDT        I have seen and evaluated this patient with my supervising physician Mona Perez, *. CHIEF COMPLAINT       Chief Complaint   Patient presents with    Other     patient states he felt like he had acid reflux last night, took his omeprazole, this AM woke up with \"uncontrollable\" shaking, was tachycardic in the 130's this AM. feeling dizzy and hot       HISTORY OF PRESENT ILLNESS   (Location, Timing/Onset, Context/Setting, Quality, Duration, Modifying Factors, Severity, Associated Signs and Symptoms)  Note limiting factors. Chief Complaint: Vero Seay is a 65 y.o. male who presents to the emergency department today for evaluation for a shaking episode. The patient states that he does have a history of reflux, and he states that he did have a burning sensation to his epigastric abdomen. The patient states he took his omeprazole, he states that this was, comfortable to lie down, and he states that he then sat up and was coughing for an hour. He states that this \"broke up what ever it was in my chest\". The patient states that his cough is never really productive. The patient states that he then went to sleep and he states he did not have any symptoms until 3:30 AM this morning, he states that he was woken up out of sleep with uncontrollable shaking, body aches, and his heart rate was elevated. The patient is unsure if he had a fever, he is afebrile here. The patient at this time states that he is feeling a little better than he did this morning. The patient denies any chest pain or shortness of breath. He has no abdominal pain, nausea, vomiting or diarrhea. He denies any urinary symptoms.   No known sick contacts he states that he has been vaccinated for COVID-19. He states these other healthy. Nursing Notes were all reviewed and agreed with or any disagreements were addressed in the HPI. REVIEW OF SYSTEMS    (2-9 systems for level 4, 10 or more for level 5)     Review of Systems   Constitutional: Negative for activity change, appetite change, chills and fever. HENT: Negative for congestion and rhinorrhea. Respiratory: Positive for cough. Negative for shortness of breath. Cardiovascular: Negative for chest pain. Gastrointestinal: Negative for abdominal pain, diarrhea, nausea and vomiting. Genitourinary: Negative for difficulty urinating, dysuria and hematuria. Positives and Pertinent negatives as per HPI. Except as noted above in the ROS, all other systems were reviewed and negative. PAST MEDICAL HISTORY     Past Medical History:   Diagnosis Date    Arthritis     Gout     Kidney stones          SURGICAL HISTORY     Past Surgical History:   Procedure Laterality Date    JOINT REPLACEMENT      knee replacement    KNEE ARTHROSCOPY  1986    TOTAL KNEE ARTHROPLASTY  2006         CURRENTMEDICATIONS       Previous Medications    ALLOPURINOL (ZYLOPRIM) 300 MG TABLET    TAKE 1 TABLET BY MOUTH EVERY DAY    ASPIRIN 81 MG EC TABLET    Take 81 mg by mouth daily. CHOLECALCIFEROL (VITAMIN D) 50 MCG (2000 UT) CAPS CAPSULE    Take by mouth    COLCHICINE-PROBENECID 0.5-500 MG PER TABLET    Take 1 tablet by mouth daily    MOMETASONE (ELOCON) 0.1 % OINTMENT    Apply topically daily. OMEPRAZOLE (PRILOSEC) 20 MG DELAYED RELEASE CAPSULE    Take 20 mg by mouth daily    RANITIDINE (ZANTAC) 300 MG TABLET    Take 300 mg by mouth    TRIAMCINOLONE (KENALOG) 0.1 % OINTMENT    Apply to affected area BID PRN flares; avoid prolonged use on the face. ALLERGIES     Patient has no known allergies.     FAMILYHISTORY       Family History   Problem Relation Age of Onset    Heart Failure Father heart disease    Cancer Brother     Hypertension Brother     Hypertension Brother     Stroke Brother           SOCIAL HISTORY       Social History     Tobacco Use    Smoking status: Never Smoker    Smokeless tobacco: Never Used   Vaping Use    Vaping Use: Never used   Substance Use Topics    Alcohol use: No    Drug use: No       SCREENINGS             PHYSICAL EXAM    (up to 7 for level 4, 8 or more for level 5)     ED Triage Vitals [06/27/21 1120]   BP Temp Temp src Pulse Resp SpO2 Height Weight   (!) 160/75 98.3 °F (36.8 °C) -- 115 16 94 % -- --       Physical Exam  Vitals and nursing note reviewed. Constitutional:       Appearance: He is well-developed. He is not diaphoretic. HENT:      Head: Normocephalic and atraumatic. Right Ear: External ear normal.      Left Ear: External ear normal.      Nose: Nose normal.   Eyes:      General:         Right eye: No discharge. Left eye: No discharge. Neck:      Trachea: No tracheal deviation. Cardiovascular:      Rate and Rhythm: Regular rhythm. Tachycardia present. Heart sounds: No murmur heard. Pulmonary:      Effort: Pulmonary effort is normal. No respiratory distress. Breath sounds: No wheezing or rales. Comments: Diminished aeration to bilateral bases  Abdominal:      General: Bowel sounds are normal. There is no distension. Palpations: Abdomen is soft. Tenderness: There is no abdominal tenderness. There is no guarding. Musculoskeletal:         General: Normal range of motion. Cervical back: Normal range of motion and neck supple. Skin:     General: Skin is warm and dry. Neurological:      Mental Status: He is alert and oriented to person, place, and time.    Psychiatric:         Behavior: Behavior normal.         DIAGNOSTIC RESULTS   LABS:    Labs Reviewed   CBC WITH AUTO DIFFERENTIAL - Abnormal; Notable for the following components:       Result Value    WBC 29.2 (*)     Neutrophils Absolute 26.3 (*)     Monocytes Absolute 1.8 (*)     All other components within normal limits    Narrative:     Performed at:  [facility]  [address] [place], [place]   Phone [phone]   COMPREHENSIVE METABOLIC PANEL - Abnormal; Notable for the following components:    Glucose 110 (*)     BUN 23 (*)     CREATININE 1.5 (*)     GFR Non- 46 (*)     GFR  56 (*)     All other components within normal limits    Narrative:     Performed at:  [facility] [address] [place], [place]   Phone [phone]   LIPASE - Abnormal; Notable for the following components:    Lipase 65.0 (*)     All other components within normal limits    Narrative:     Performed at:  [facility]  [address], [place]   Phone [phone]   PROCALCITONIN - Abnormal; Notable for the following components:    Procalcitonin 14.61 (*)     All other components within normal limits    Narrative:     Performed at:  [facility] [address] [place], [place]   Phone [phone]   CULTURE, BLOOD 2   CULTURE, BLOOD 1   TROPONIN    Narrative:     Performed at:  [facility] [address] [place], [place]   Phone [phone] PEPTIDE    Narrative:     Performed at:  [facility] [address] [place], [place]   Phone [phone]   URINE RT REFLEX TO CULTURE    Narrative:     Performed at:  [facility] [address] [place], [place]   Phone [phone]   LACTATE, SEPSIS    Narrative:     Performed at:  [facility] [address], [place]   Phone [phone]   LACTATE, SEPSIS       All other labs were within normal range or not returned as of this dictation. EKG:  All EKG's are interpreted by the Emergency Department Physician in the absence of a cardiologist.  Please see their note for interpretation of EKG. RADIOLOGY:   Non-plain film images such as CT, Ultrasound and MRI are read by the radiologist. Plain radiographic images are visualized and preliminarily interpreted by the ED Provider with the below findings:        Interpretation per the Radiologist below, if available at the time of this note:    XR CHEST PORTABLE   Final Result   New right upper lobe airspace disease appearance most suggestive of pneumonia   in the acute setting although nonspecific and follow-up PA and lateral view   chest x-ray recommended in 2 weeks after treatment for pneumonia. XR CHEST PORTABLE    Result Date: 6/27/2021  EXAMINATION: ONE XRAY VIEW OF THE CHEST 6/27/2021 11:47 am COMPARISON: Chest x-ray July 14, 2016 HISTORY: ORDERING SYSTEM PROVIDED HISTORY: Chest Discomfort TECHNOLOGIST PROVIDED HISTORY: Reason for exam:->Chest Discomfort Reason for Exam: acid reflux last night, took his omeprazole, this AM woke up with \"uncontrollable\" shaking, was tachycardic in the 130's this AM. feeling dizzy and hot Acuity: Acute Type of Exam: Initial FINDINGS: New ill-defined focus of airspace disease in the right upper lobe. Evidence of pneumonia or other acute findings elsewhere. No pneumothorax or pleural effusion. New right upper lobe airspace disease appearance most suggestive of pneumonia in the acute setting although nonspecific and follow-up PA and lateral view chest x-ray recommended in 2 weeks after treatment for pneumonia. PROCEDURES   Unless otherwise noted below, none     Procedures    CRITICAL CARE TIME   Critical Care  There was a high probability of life-threatening clinical deterioration in the patient's condition requiring my urgent intervention. Total critical care time with the patient was 40 minutes excluding separately reportable procedures.   Critical care on omeprazole. He did disclose to another attending physician that he was eating ice cream last night, and he felt that the ice cream went down the wrong way. On examination he is tachycardic. He has a leukocytosis of 29.2. His lactic acid is normal.  CMP does show a mild dehydration with a BUN of 23 creatinine 1.5. His troponin is negative. His procalcitonin is 14.61. X-ray does show a new right upper lobe airspace disease. Patient symptoms today could certainly be due to aspiration pneumonia he was covered with Rocephin, Zithromax and Zosyn. He was given the 30 ml/kg fluid bolus. He will need to be admitted for further care and evaluation, hospitalist has agreed for admission, the patient is updated and agreeable with plan. Stable for admission    FINAL IMPRESSION      1. Septicemia ([address]    2. Pneumonia of right middle lobe due to infectious organism          DISPOSITION/PLAN   DISPOSITION Admitted 06/27/2021 02:01:58 PM      PATIENT REFERRED TO:  No follow-up provider specified.     DISCHARGE MEDICATIONS:  New Prescriptions    No medications on file       DISCONTINUED MEDICATIONS:  Discontinued Medications    No medications on file              (Please note that portions of this note were completed with a voice recognition program.  Efforts were made to edit the dictations but occasionally words are mis-transcribed.)    Kateryna Martinez PA-C (electronically signed)            Kateryna Martinez PA-C  06/27/21 [id number]

## 2021-06-28 VITALS
SYSTOLIC BLOOD PRESSURE: 117 MMHG | OXYGEN SATURATION: 95 % | HEIGHT: 66 IN | BODY MASS INDEX: 38.27 KG/M2 | DIASTOLIC BLOOD PRESSURE: 66 MMHG | RESPIRATION RATE: 16 BRPM | HEART RATE: 53 BPM | TEMPERATURE: 98.3 F | WEIGHT: 238.1 LBS

## 2021-06-28 LAB
ANION GAP SERPL CALCULATED.3IONS-SCNC: 8 MMOL/L (ref 3–16)
BASOPHILS ABSOLUTE: 0.1 K/UL (ref 0–0.2)
BASOPHILS RELATIVE PERCENT: 0.7 %
BUN BLDV-MCNC: 19 MG/DL (ref 7–20)
CALCIUM SERPL-MCNC: 8.9 MG/DL (ref 8.3–10.6)
CHLORIDE BLD-SCNC: 107 MMOL/L (ref 99–110)
CO2: 25 MMOL/L (ref 21–32)
CREAT SERPL-MCNC: 1.5 MG/DL (ref 0.8–1.3)
EOSINOPHILS ABSOLUTE: 0.1 K/UL (ref 0–0.6)
EOSINOPHILS RELATIVE PERCENT: 0.5 %
GFR AFRICAN AMERICAN: 56
GFR NON-AFRICAN AMERICAN: 46
GLUCOSE BLD-MCNC: 119 MG/DL (ref 70–99)
HCT VFR BLD CALC: 41.5 % (ref 40.5–52.5)
HEMOGLOBIN: 14.1 G/DL (ref 13.5–17.5)
LYMPHOCYTES ABSOLUTE: 1.7 K/UL (ref 1–5.1)
LYMPHOCYTES RELATIVE PERCENT: 9.9 %
MCH RBC QN AUTO: 33 PG (ref 26–34)
MCHC RBC AUTO-ENTMCNC: 33.9 G/DL (ref 31–36)
MCV RBC AUTO: 97.3 FL (ref 80–100)
MONOCYTES ABSOLUTE: 1 K/UL (ref 0–1.3)
MONOCYTES RELATIVE PERCENT: 5.8 %
NEUTROPHILS ABSOLUTE: 14.4 K/UL (ref 1.7–7.7)
NEUTROPHILS RELATIVE PERCENT: 83.1 %
PDW BLD-RTO: 13.3 % (ref 12.4–15.4)
PLATELET # BLD: 236 K/UL (ref 135–450)
PMV BLD AUTO: 7.3 FL (ref 5–10.5)
POTASSIUM REFLEX MAGNESIUM: 4.5 MMOL/L (ref 3.5–5.1)
RBC # BLD: 4.27 M/UL (ref 4.2–5.9)
SODIUM BLD-SCNC: 140 MMOL/L (ref 136–145)
WBC # BLD: 17.4 K/UL (ref 4–11)

## 2021-06-28 PROCEDURE — 6370000000 HC RX 637 (ALT 250 FOR IP): Performed by: INTERNAL MEDICINE

## 2021-06-28 PROCEDURE — 6360000002 HC RX W HCPCS: Performed by: INTERNAL MEDICINE

## 2021-06-28 PROCEDURE — 92526 ORAL FUNCTION THERAPY: CPT

## 2021-06-28 PROCEDURE — 85025 COMPLETE CBC W/AUTO DIFF WBC: CPT

## 2021-06-28 PROCEDURE — 36415 COLL VENOUS BLD VENIPUNCTURE: CPT

## 2021-06-28 PROCEDURE — 2580000003 HC RX 258: Performed by: INTERNAL MEDICINE

## 2021-06-28 PROCEDURE — C9113 INJ PANTOPRAZOLE SODIUM, VIA: HCPCS | Performed by: INTERNAL MEDICINE

## 2021-06-28 PROCEDURE — 96376 TX/PRO/DX INJ SAME DRUG ADON: CPT

## 2021-06-28 PROCEDURE — G0378 HOSPITAL OBSERVATION PER HR: HCPCS

## 2021-06-28 PROCEDURE — 99223 1ST HOSP IP/OBS HIGH 75: CPT | Performed by: INTERNAL MEDICINE

## 2021-06-28 PROCEDURE — 92610 EVALUATE SWALLOWING FUNCTION: CPT

## 2021-06-28 PROCEDURE — 96372 THER/PROPH/DIAG INJ SC/IM: CPT

## 2021-06-28 PROCEDURE — 96366 THER/PROPH/DIAG IV INF ADDON: CPT

## 2021-06-28 PROCEDURE — 80048 BASIC METABOLIC PNL TOTAL CA: CPT

## 2021-06-28 RX ORDER — AMOXICILLIN AND CLAVULANATE POTASSIUM 875; 125 MG/1; MG/1
1 TABLET, FILM COATED ORAL 2 TIMES DAILY
Qty: 14 TABLET | Refills: 0 | Status: SHIPPED | OUTPATIENT
Start: 2021-06-28 | End: 2021-07-05

## 2021-06-28 RX ADMIN — ASPIRIN 81 MG: 81 TABLET, CHEWABLE ORAL at 08:23

## 2021-06-28 RX ADMIN — ENOXAPARIN SODIUM 40 MG: 40 INJECTION SUBCUTANEOUS at 08:23

## 2021-06-28 RX ADMIN — PANTOPRAZOLE SODIUM 40 MG: 40 INJECTION, POWDER, FOR SOLUTION INTRAVENOUS at 08:31

## 2021-06-28 RX ADMIN — PIPERACILLIN AND TAZOBACTAM 3375 MG: 3; .375 INJECTION, POWDER, LYOPHILIZED, FOR SOLUTION INTRAVENOUS at 00:28

## 2021-06-28 RX ADMIN — Medication 10 ML: at 08:31

## 2021-06-28 RX ADMIN — PIPERACILLIN AND TAZOBACTAM 3375 MG: 3; .375 INJECTION, POWDER, LYOPHILIZED, FOR SOLUTION INTRAVENOUS at 08:29

## 2021-06-28 RX ADMIN — SODIUM CHLORIDE 25 ML: 9 INJECTION, SOLUTION INTRAVENOUS at 00:26

## 2021-06-28 RX ADMIN — ALLOPURINOL 300 MG: 300 TABLET ORAL at 08:23

## 2021-06-28 ASSESSMENT — PAIN SCALES - GENERAL
PAINLEVEL_OUTOF10: 0
PAINLEVEL_OUTOF10: 0

## 2021-06-28 NOTE — PROGRESS NOTES
Patient has been discharged per MD orders. Patient verbalizes understanding of all instructions, medications, and follow up. IV has been removed, catheter intact, patient tolerated well. All belongings have been gathered and packed. Family present to transport patient from hospital. Patient ambulated to lobby in stable condition.

## 2021-06-28 NOTE — PROGRESS NOTES
Facility/Department: [address] ONCOLOGY  Initial Assessment  DYSPHAGIA BEDSIDE SWALLOW EVALUATION     Patient: Christy Khoury   : 1950   MRN: [id number]      Evaluation Date: 2021   Admitting Diagnosis: Sepsis ([address] [A41.9]  Pain: denied                         H&P: \"Mark Blevins is a 65 y.o. male  with PMHx of gout, GERD and arthritis presented to the ED for the evaluation of shaking chills and tachycardia. Patient reported that yesterday evening he ate ice cream around 8 PM after that felt like there was something stuck in his throat and was not going down and started coughing. At night, it was difficult for him to lay down flat and had retrosternal burning sensation which he thought that he was having GERD symptoms and took omeprazole. Patient went to bed but woke up around 3:30 AM a.m. with severe shaking chills, palpitations, feeling hot. Patient reported that he had a temp of 99 at that time and was satting around 88- 89%. Patient stated that the shaking episode continued off and on until 4:30 AM and  he decided to come to the hospital for further evaluation. Patient denied any chest pain, shortness of breath, abdominal pain, nausea, vomiting, diarrhea, any new medication, any recent travel or sick contact. Patient has been vaccinated with COVID-19. On admission, patient was afebrile but  tachycardic. Initial diagnostic lab work showed WBC elevated to 29.2, BUN of 23, creatinine of 1.5, procalcitonin: 14.6. Chest x-ray showed new right upper  lobe airspace disease. Patient was given IV fluids according to sepsis protocol and received Rocephin and Zithromax. \"    Recent Chest xray: 21  Impression   New right upper lobe airspace disease appearance most suggestive of pneumonia   in the acute setting although nonspecific and follow-up PA and lateral view   chest x-ray recommended in 2 weeks after treatment for pneumonia.      History/Prior Level of Function:   Living Status: home cannula   []Other:    Dentition:  [x]Adequate  [x]Dentures  (lower partial)  []Missing Many Teeth  []Edentulous  []Other:    Baseline Vocal Quality:  [x]Normal  []Dysphonic   []Aphonic   []Hoarse  []Wet  []Weak  []Other:    Volitional Cough:  [x]Strong  []Weak  []Wet  []Absent  []Congested  []Other:    Volitional Swallow:   []Absent   []Delayed     [x]Adequate     []Required use of drink     Oral Mechanism Exam:  [x]WFL []Mild   [] Moderate  []Severe  []To be assessed  Impaired:   []Left side      []Right side    []Labial ROM/Coordination    []Labial Symmetry   []Lingual ROM/Coordination   []Lingual Symmetry  []Gag  []Other:     Oral Phase: [x]WFL []Mild   [] Moderate  []Severe  []To be assessed   []Impaired/Prolonged Mastication:   []Spillage Left:   []Spillage Right:  []Pocketing Left:   []Pocketing Right:   []Decreased Anterior to Posterior Transit:   []Suspected Premature Bolus Loss:   []Lingual/Palatal Residue:   []Other:     Pharyngeal Phase: [x]WFL []Mild   [] Moderate  []Severe  []To be assessed   []Delayed Swallow:   []Suspected Pharyngeal Pooling:   []Decreased Laryngeal Elevation:   []Absent Swallow:  []Wet Vocal Quality:   []Throat Clearing-Immediate:   []Throat Clearing-Delayed:   []Cough-Immediate:   []Cough-Delayed:  []Change in Vital Signs:  []Suspected Delayed Pharyngeal Clearing:  []Other:       Eating Assistance:  [x]Independent  []Setup or clean-up assistance   [] Supervision or touching assistance   [] Partial or moderate assistance   [] Substantial or maximal assistance  [] Dependent       EDUCATION:   Provided education regarding role of SLP, results of assessment, recommendations and general speech pathology plan of care. [x] Pt verbalized understanding and agreement   [] Pt requires ongoing learning   [] No evidence of comprehension     If patient discharges prior to next visit, this note will serve as discharge.      Timed Code Minutes: 0 minutes  Total Treatment Minutes: 23 minutes    Electronically signed by:     Carmelita Boss M.S. [id number] [facility]  Speech-Language Pathologist

## 2021-06-28 NOTE — CONSULTS
PULMONARY AND CRITICAL CARE MEDICINE CONSULTATION NOTE    CONSULTING PHYSICIAN:  Bhupinder Willoughby MD    ADMISSION DATE: 6/27/2021  ADMISSION DIAGNOSIS: Sepsis ([facility].) [A41.9]    REASON FOR CONSULT:   Chief Complaint   Patient presents with    Other     patient states he felt like he had acid reflux last night, took his omeprazole, this AM woke up with \"uncontrollable\" shaking, was tachycardic in the 130's this AM. feeling dizzy and hot       DATE OF CONSULT: 6/27/2021    HISTORY OF PRESENT ILLNESS: 65y.o. year old male with a past medical history significant for arthritis, gout who presented to the hospital with fevers, chills of sudden onset. Patient reports that 2 days ago he was eating ice cream when he felt something get stuck in the back of his throat. He also had some heartburn at that time. During the night while sleeping he reported having fevers, chills and shivering. Since it did not subside, patient presented to the hospital.  On further questioning patient reports that at the gym where he goes to exercise, he was in contact with a sick person about 4 days ago. In the hospital he was seen to have a right upper lobe consolidation. Suspected to be aspiration pneumonia and has been started on antibiotics. At the time of interview patient sitting in bed on room air not in any respiratory distress. Feels back to normal.  Denies any shortness of breath, cough, discolored expectoration, fevers or night sweats. REVIEW OF SYSTEMS:     CONSTITUTIONAL SYMPTOMS: The patient denies fever, fatigue, night sweats, weight loss or weight gain. HEENT: No vision changes. No tinnitus, Denies sinus pain. No hoarseness, or dysphagia. NECK: Patient denies swelling in the neck. CARDIOVASCULAR: Denies chest pain, palpitation, syncope. RESPIRATORY: Denies shortness of breath or cough. GASTROINTESTINAL: Denies nausea, abdominal pain or change in bowel function. GENITOURINARY: Denies obstructive symptoms.  No history of incontinence. BREASTS: No masses or lumps in the breasts. SKIN: No rashes or itching. MUSCULOSKELETAL: Denies weakness or bone pain. NEUROLOGICAL: No headaches or seizures. PSYCHIATRIC: Denies mood swings or depression. ENDOCRINE: Denies heat or cold intolerance or excessive thirst.  HEMATOLOGIC/LYMPHATIC: Denies easy bruising or lymph node swelling. ALLERGIC/IMMUNOLOGIC: No environmental allergies. PAST MEDICAL HISTORY:   Past Medical History:   Diagnosis Date    Arthritis     Gout     Kidney stones        PAST SURGICAL HISTORY:   Past Surgical History:   Procedure Laterality Date    JOINT REPLACEMENT      knee replacement    KNEE ARTHROSCOPY  1986    TOTAL KNEE ARTHROPLASTY  2006       SOCIAL HISTORY:   Social History     Tobacco Use    Smoking status: Never Smoker    Smokeless tobacco: Never Used   Vaping Use    Vaping Use: Never used   Substance Use Topics    Alcohol use: No    Drug use: No       FAMILY HISTORY:   Family History   Problem Relation Age of Onset    Heart Failure Father         heart disease    Cancer Brother     Hypertension Brother     Hypertension Brother     Stroke Brother        MEDICATIONS:     No current facility-administered medications on file prior to encounter. Current Outpatient Medications on File Prior to Encounter   Medication Sig Dispense Refill    Cholecalciferol (VITAMIN D) 50 MCG (2000 UT) CAPS capsule Take by mouth      allopurinol (ZYLOPRIM) 300 MG tablet TAKE 1 TABLET BY MOUTH EVERY DAY      omeprazole (PRILOSEC) 20 MG delayed release capsule Take 20 mg by mouth daily      aspirin 81 MG EC tablet Take 81 mg by mouth daily.  colchicine-probenecid 0.5-500 MG per tablet Take 1 tablet by mouth daily      mometasone (ELOCON) 0.1 % ointment Apply topically daily.  45 g 0    ranitidine (ZANTAC) 300 MG tablet Take 300 mg by mouth      triamcinolone (KENALOG) 0.1 % ointment Apply to affected area BID PRN flares; avoid prolonged use on the face. 30 g 0        sodium chloride flush  5-40 mL Intravenous 2 times per day    enoxaparin  40 mg Subcutaneous Daily    allopurinol  300 mg Oral Daily    aspirin  81 mg Oral Daily    pantoprazole  40 mg Intravenous Daily    piperacillin-tazobactam  3,375 mg Intravenous Q8H      sodium chloride 25 mL (06/28/21 0026)     sodium chloride flush, sodium chloride, acetaminophen **OR** acetaminophen, albuterol, ipratropium-albuterol      ALLERGIES:   Allergies as of 06/27/2021    (No Known Allergies)      OBJECTIVE:   height is 5' 6\" (1.676 m) and weight is 238 lb 1.6 oz (108 kg). His oral temperature is 98.3 °F (36.8 °C). His blood pressure is 117/66 and his pulse is 53. His respiration is 16 and oxygen saturation is 95%. I/O this shift:  In: 240 [P.O.:240]  Out: -      PHYSICAL EXAM:    CONSTITUTIONAL: He is a 65y.o.-year-old who appears his stated age. He is alert and oriented x 3 and in no acute distress. HEENT: PERRLA, EOMI. No scleral icterus. No thrush, atraumatic, normocephalic. NECK: Supple, without cervical or supraclavicular lymphadenopathy:  CARDIOVASCULAR: S1 S2 RRR. Without murmer  RESPIRATORY & CHEST: Lungs are clear to auscultation and percussion. No wheezing, no crackles. Good air movement  GASTROINTESTINAL & ABDOMEN: Soft, nontender, positive bowel sounds in all quadrants, non-distended, without hepatosplenomegaly. GENITOURINARY: Deferred. MUSCULOSKELETAL: No tenderness to palpation of the axial skeleton. There is no clubbing. No cyanosis. No edema of the lower extremities. SKIN OF BODY: No rash or jaundice. PSYCHIATRIC EVALUATION: Normal affect. Patient answers questions appropriately. HEMATOLOGIC/LYMPHATIC/ IMMUNOLOGIC: No palpable lymphadenopathy. NEUROLOGIC: Alert and oriented x 3. Groslly non-focal. Motor strength is 5+/5 in all muscle groups. The patient has a normal sensorium globally.       LABS:  Recent Labs     06/27/21  1153 06/28/21  0530   WBC 29.2* 17.4* HGB 16.5 14.1   HCT 48.5 41.5    236   ALT 23  --    AST 35  --     140   K 4.6 4.5    107   CREATININE 1.5* 1.5*   BUN 23* 19   CO2 26 25       Recent Labs     06/27/21  1153 06/28/21  0530   GLUCOSE 110* 119*   CALCIUM 9.9 8.9    140   K 4.6 4.5   CO2 26 25    107   BUN 23* 19   CREATININE 1.5* 1.5*       No results for input(s): PHART, CPN6HSK, PO2ART, SIB5PZI, J8APSSJT, BEART, A8QVTVMY in the last 72 hours. No results found for: INR, PROTIME  No results found for: AMYLASE   No results found for: LABA1C  No results found for: EAG  No results found for: TSH, M1WWBPW, X8YXITG, THYROIDAB, FT3, T4FREE  Lab Results   Component Value Date    TROPONINI <0.01 06/27/2021      No results found for: CRP   No results found for: BNP   No results found for: DDIMER   No results found for: FERRITIN   No results found for: LACTA        IMAGING:    Chest x-ray portable 1 view done on 6/27/2021 1 was personally reviewed by me mentations: Right upper lobe focal consolidation is seen. No pleural effusions or pneumothorax seen. Cardiac silhouette is within normal limits. IMPRESSION:     1. Community-acquired pneumonia    RECOMMENDATION:     1. Patient has presented to the hospital with fevers, chills and was found to have right upper lobe consolidation. 2. The location of the pneumonia is atypical for aspiration pneumonia. I suspect that this is community-acquired pneumonia more likely to recent sick contact. 3. With ongoing antibiotic therapy, patient has already started to feel better. He is on room air not in any respiratory distress. 4. I would recommend to change the patient to p.o. Augmentin and finish a total of 5 to 7 days of antibiotics course. 5. Patient can follow-up with me in 14 days post hospital discharge in my office. He should get a chest x-ray prior to the visit in order to make sure that he has had radiological resolution.   6. From pulmonary standpoint patient can be discharged back home today. Thank you for your consultation. Marilu Powell MD  Pulmonary Critical Care and Sleep Medicine  6/27/2021, 11:21 AM    This note was completed using dragon medical speech recognition software. Grammatical errors, random word insertions, pronoun errors and incomplete sentences are occasional consequences of this technology due to software limitations. If there are questions or concerns about the content of this note of information contained within the body of this dictation they should be addressed with the provider for clarification.

## 2021-06-28 NOTE — PROGRESS NOTES
CLINICAL PHARMACY NOTE: MEDS TO BEDS    Total # of Prescriptions Filled: 1   The following medications were delivered to the patient:  · Augmentin [id number]    Additional Documentation:  Patient picked up from [address] [place] [address]

## 2021-06-29 LAB
EKG ATRIAL RATE: 104 BPM
EKG DIAGNOSIS: NORMAL
EKG P AXIS: 60 DEGREES
EKG P-R INTERVAL: 160 MS
EKG Q-T INTERVAL: 336 MS
EKG QRS DURATION: 82 MS
EKG QTC CALCULATION (BAZETT): 441 MS
EKG R AXIS: 9 DEGREES
EKG T AXIS: 25 DEGREES
EKG VENTRICULAR RATE: 104 BPM

## 2021-06-29 PROCEDURE — 93010 ELECTROCARDIOGRAM REPORT: CPT | Performed by: INTERNAL MEDICINE

## 2021-07-01 LAB
BLOOD CULTURE, ROUTINE: NORMAL
CULTURE, BLOOD 2: NORMAL

## 2021-07-03 NOTE — DISCHARGE SUMMARY
Hospital Medicine Discharge Summary    Patient ID: Lian Hickman      Patient's PCP: Jarrett Lomax MD    Admit Date: 6/27/2021     Discharge Date: 6/28/2021     Admitting Physician: Sailaja Chaudhari MD     Discharge Physician: Roque Anglin MD        Active Hospital Problems    Diagnosis     Sepsis [facility]) [A41.9]        The patient was seen and examined on day of discharge and this discharge summary is in conjunction with any daily progress note from day of discharge. Hospital Course: This 47-year-old male with PMHx of gout, GERD and arthritis presented to the ED for the evaluation of shaking chills and tachycardia. On admission, patient was afebrile but  tachycardic. Initial diagnostic lab work showed WBC elevated to 29.2, BUN of 23, creatinine of 1.5, procalcitonin: 14.6. Chest x-ray showed new right upper  lobe airspace disease. Patient was given IV fluids according to sepsis protocol Pulmonology was consulted and patient was started on started on IV antibiotics. Patient remained afebrile during hospital stay and WBCs count trended down. Patient was switched to oral antibiotic and discharged home in stable condition and was instructed to follow-up with pulmonology in 2 weeks. SUSANNA likely secondary to sepsis  Serum creatinine 1.5 on admission; no baseline creatinine was available  Patient received IV fluids during hospital stay and was instructed to follow-up with PCP for repeat renal profile. Physical Exam Performed:     /66   Pulse 53   Temp 98.3 °F (36.8 °C) (Oral)   Resp 16   Ht 5' 6\" (1.676 m)   Wt 238 lb 1.6 oz (108 kg)   SpO2 95%   BMI 38.43 kg/m²       General appearance:  No apparent distress, appears stated age and cooperative. Respiratory:  Normal respiratory effort. Scattered crackles over right side  Cardiovascular:  Regular rate and rhythm with normal S1/S2 without murmurs, rubs or gallops.   Abdomen: Soft, non-tender, non-distended with normal bowel sounds. Musculoskeletal:  No clubbing, cyanosis or edema bilaterally. Full range of motion without deformity. Neurologic:  Neurovascularly intact without any focal sensory/motor deficits. Cranial nerves: II-XII intact, grossly non-focal.  Skin: Skin color, texture, turgor normal.  No rashes or lesions. Psychiatric:  Alert and oriented, thought content appropriate, normal insight    Labs: For convenience and continuity at follow-up the following most recent labs are provided:      CBC:    Lab Results   Component Value Date    WBC 17.4 06/28/2021    HGB 14.1 06/28/2021    HCT 41.5 06/28/2021     06/28/2021       Renal:    Lab Results   Component Value Date     06/28/2021    K 4.5 06/28/2021     06/28/2021    CO2 25 06/28/2021    BUN 19 06/28/2021    CREATININE 1.5 06/28/2021    CALCIUM 8.9 06/28/2021         Significant Diagnostic Studies    Radiology:   XR CHEST PORTABLE   Final Result   New right upper lobe airspace disease appearance most suggestive of pneumonia   in the acute setting although nonspecific and follow-up PA and lateral view   chest x-ray recommended in 2 weeks after treatment for pneumonia. XR CHEST LATERAL    (Results Pending)      Consults:     IP CONSULT TO PULMONOLOGY    Disposition: Home    Condition at Discharge: Stable     Discharge Instructions/Follow-up:   Follow up with your PCP within 7-10 days of discharge. Follow up with pulmonology in 2 weeks after obtaining the chest x-ray  Take all your medications as prescribed.     Discharge Medications:     Discharge Medication List as of 6/28/2021  1:17 PM           Details   amoxicillin-clavulanate (AUGMENTIN) 875-125 MG per tablet Take 1 tablet by mouth 2 times daily for 7 days, Disp-14 tablet, R-0Normal              Details   Cholecalciferol (VITAMIN D) 50 MCG (2000 UT) CAPS capsule Take by mouthHistorical Med      allopurinol (ZYLOPRIM) 300 MG tablet TAKE 1 TABLET BY MOUTH EVERY DAYHistorical Med colchicine-probenecid 0.5-500 MG per tablet Take 1 tablet by mouth dailyHistorical Med      mometasone (ELOCON) 0.1 % ointment Apply topically daily. , Disp-45 g, R-0, Normal      omeprazole (PRILOSEC) 20 MG delayed release capsule Take 20 mg by mouth daily      triamcinolone (KENALOG) 0.1 % ointment Apply to affected area BID PRN flares; avoid prolonged use on the face., Disp-30 g, R-0, Normal      aspirin 81 MG EC tablet Take 81 mg by mouth daily. Time Spent on discharge is more than 30 mins in the examination, evaluation, counseling and review of medications and discharge plan. Signed:    Jigar Manrique MD   7/3/2021      Thank you Kevin Holland MD for the opportunity to be involved in this patient's care. If you have any questions or concerns please feel free to contact me at [phone].

## 2021-07-12 ENCOUNTER — OFFICE VISIT (OUTPATIENT)
Dept: PULMONOLOGY | Age: 71
End: 2021-07-12
Payer: MEDICARE

## 2021-07-12 ENCOUNTER — HOSPITAL ENCOUNTER (OUTPATIENT)
Dept: GENERAL RADIOLOGY | Age: 71
Discharge: HOME | End: 2021-07-12
Payer: MEDICARE

## 2021-07-12 ENCOUNTER — HOSPITAL ENCOUNTER (OUTPATIENT)
Age: 71
Discharge: HOME | End: 2021-07-12
Payer: MEDICARE

## 2021-07-12 VITALS
BODY MASS INDEX: 28.45 KG/M2 | DIASTOLIC BLOOD PRESSURE: 80 MMHG | SYSTOLIC BLOOD PRESSURE: 132 MMHG | OXYGEN SATURATION: 98 % | WEIGHT: 177 LBS | HEART RATE: 60 BPM | HEIGHT: 66 IN

## 2021-07-12 DIAGNOSIS — J18.9 COMMUNITY ACQUIRED PNEUMONIA OF RIGHT UPPER LOBE OF LUNG: Primary | ICD-10-CM

## 2021-07-12 DIAGNOSIS — J18.9 PNEUMONIA OF RIGHT MIDDLE LOBE DUE TO INFECTIOUS ORGANISM: ICD-10-CM

## 2021-07-12 PROCEDURE — 71046 X-RAY EXAM CHEST 2 VIEWS: CPT

## 2021-07-12 PROCEDURE — 99214 OFFICE O/P EST MOD 30 MIN: CPT | Performed by: INTERNAL MEDICINE

## 2021-07-12 RX ORDER — SILDENAFIL CITRATE 20 MG/1
TABLET ORAL
COMMUNITY
Start: 2021-05-15

## 2021-07-12 NOTE — PROGRESS NOTES
PULMONARY AND CRITICAL CARE MEDICINE OFFICE FOLLOW UP NOTE    REASON FOR FOLLOW UP:   Chief Complaint   Patient presents with    Follow-Up from Hospital     pneumonia        DATE OF CONSULT: 7/12/2021    HISTORY OF PRESENT ILLNESS: 66y.o. year old male with a past medical history significant for arthritis, gout comes into the pulmonary office for follow-up. Patient was recently hospitalized with complaints of shortness of breath, cough and found to have right upper lobe pneumonia. Patient received antibiotics both in the hospital and as an outpatient with which his symptoms are resolved. Today reports stable breathing without any recurrent symptoms. At the time of hospitalization patient reported that he had a sick contact at the gym where he works out. He also reports of acid reflux which is well controlled with omeprazole. Occasional worsening with ingestion of chocolate. Weight has been stable. REVIEW OF SYSTEMS:     CONSTITUTIONAL SYMPTOMS: The patient denies fever, fatigue, night sweats, weight loss or weight gain. HEENT: No vision changes. No tinnitus, Denies sinus pain. No hoarseness, or dysphagia. NECK: Patient denies swelling in the neck. CARDIOVASCULAR: Denies chest pain, palpitation, syncope. RESPIRATORY: Denies shortness of breath or cough. GASTROINTESTINAL: Denies nausea, abdominal pain or change in bowel function. GENITOURINARY: Denies obstructive symptoms. No history of incontinence. BREASTS: No masses or lumps in the breasts. SKIN: No rashes or itching. MUSCULOSKELETAL: Denies weakness or bone pain. NEUROLOGICAL: No headaches or seizures. PSYCHIATRIC: Denies mood swings or depression. ENDOCRINE: Denies heat or cold intolerance or excessive thirst.  HEMATOLOGIC/LYMPHATIC: Denies easy bruising or lymph node swelling. ALLERGIC/IMMUNOLOGIC: No environmental allergies.     PAST MEDICAL HISTORY:   Past Medical History:   Diagnosis Date    Arthritis     Gout     Kidney stones        PAST SURGICAL HISTORY:   Past Surgical History:   Procedure Laterality Date    JOINT REPLACEMENT      knee replacement    KNEE ARTHROSCOPY  1986    TOTAL KNEE ARTHROPLASTY  2006       SOCIAL HISTORY:   Social History     Tobacco Use    Smoking status: Never Smoker    Smokeless tobacco: Never Used   Vaping Use    Vaping Use: Never used   Substance Use Topics    Alcohol use: No    Drug use: No       FAMILY HISTORY:   Family History   Problem Relation Age of Onset    Heart Failure Father         heart disease    Cancer Brother     Hypertension Brother     Hypertension Brother     Stroke Brother        MEDICATIONS:     Current Outpatient Medications on File Prior to Visit   Medication Sig Dispense Refill    Cholecalciferol (VITAMIN D) 50 MCG (2000 UT) CAPS capsule Take by mouth      allopurinol (ZYLOPRIM) 300 MG tablet TAKE 1 TABLET BY MOUTH EVERY DAY      colchicine-probenecid 0.5-500 MG per tablet Take 1 tablet by mouth daily      mometasone (ELOCON) 0.1 % ointment Apply topically daily. 45 g 0    omeprazole (PRILOSEC) 20 MG delayed release capsule Take 20 mg by mouth daily      triamcinolone (KENALOG) 0.1 % ointment Apply to affected area BID PRN flares; avoid prolonged use on the face. 30 g 0    aspirin 81 MG EC tablet Take 81 mg by mouth daily. No current facility-administered medications on file prior to visit. ALLERGIES:   Allergies as of 07/12/2021    (No Known Allergies)      OBJECTIVE:   height is 5' 6\" (1.676 m). I/O this shift:  In: 240 [P.O.:240]  Out: -      PHYSICAL EXAM:    CONSTITUTIONAL: He is a 66y.o.-year-old who appears his stated age. He is alert and oriented x 3 and in no acute distress. HEENT: PERRLA, EOMI. No scleral icterus. No thrush, atraumatic, normocephalic. NECK: Supple, without cervical or supraclavicular lymphadenopathy:  CARDIOVASCULAR: S1 S2 RRR.  Without murmer  RESPIRATORY & CHEST: Lungs are clear to auscultation and percussion. No wheezing, no crackles. Good air movement  GASTROINTESTINAL & ABDOMEN: Soft, nontender, positive bowel sounds in all quadrants, non-distended, without hepatosplenomegaly. GENITOURINARY: Deferred. MUSCULOSKELETAL: No tenderness to palpation of the axial skeleton. There is no clubbing. No cyanosis. No edema of the lower extremities. SKIN OF BODY: No rash or jaundice. PSYCHIATRIC EVALUATION: Normal affect. Patient answers questions appropriately. HEMATOLOGIC/LYMPHATIC/ IMMUNOLOGIC: No palpable lymphadenopathy. NEUROLOGIC: Alert and oriented x 3. Groslly non-focal. Motor strength is 5+/5 in all muscle groups. The patient has a normal sensorium globally. LABS:  No results for input(s): WBC, HGB, HCT, PLT, CHOL, TRIG, HDL, LDLDIRECT, ALT, AST, NA, K, CL, CREATININE, BUN, CO2, TSH, PSA, INR, GLUF, MICROALBUR in the last 72 hours. Invalid input(s): HGBA1C    No results for input(s): GLUCOSE, CALCIUM, NA, K, CO2, CL, BUN, CREATININE in the last 72 hours. No results for input(s): PHART, MSS1WSS, PO2ART, CLZ6UPU, Z2DSWZSD, BEART, T8UCEGLR in the last 72 hours. No results found for: INR, PROTIME  No results found for: AMYLASE   No results found for: LABA1C  No results found for: EAG  No results found for: TSH, O1IOHAY, Q1DSBKC, THYROIDAB, FT3, T4FREE  Lab Results   Component Value Date    TROPONINI <0.01 06/27/2021      No results found for: CRP   No results found for: BNP   No results found for: DDIMER   No results found for: FERRITIN   No results found for: LACTA        IMAGING:    Chest x-ray portable 1 view done on 7/12/2021 was personally reviewed by me and my interpretation is: Bilateral lung fields are within normal limits. Right upper lobe consolidation has resolved. No pleural effusions, pneumothorax seen. Cardiac silhouette is within normal limits. IMPRESSION:     1. Community-acquired pneumonia, resolved    RECOMMENDATION:     1.  Patient's respiratory status is

## 2022-05-23 ENCOUNTER — OFFICE VISIT (OUTPATIENT)
Dept: DERMATOLOGY | Age: 72
End: 2022-05-23
Payer: MEDICARE

## 2022-05-23 DIAGNOSIS — L30.9 DERMATITIS: Primary | ICD-10-CM

## 2022-05-23 DIAGNOSIS — L57.0 AK (ACTINIC KERATOSIS): ICD-10-CM

## 2022-05-23 DIAGNOSIS — D22.9 MULTIPLE NEVI: ICD-10-CM

## 2022-05-23 DIAGNOSIS — D48.5 NEOPLASM OF UNCERTAIN BEHAVIOR OF SKIN: ICD-10-CM

## 2022-05-23 DIAGNOSIS — L82.1 SEBORRHEIC KERATOSES: ICD-10-CM

## 2022-05-23 PROCEDURE — 17003 DESTRUCT PREMALG LES 2-14: CPT | Performed by: DERMATOLOGY

## 2022-05-23 PROCEDURE — 17000 DESTRUCT PREMALG LESION: CPT | Performed by: DERMATOLOGY

## 2022-05-23 PROCEDURE — 99214 OFFICE O/P EST MOD 30 MIN: CPT | Performed by: DERMATOLOGY

## 2022-05-23 PROCEDURE — 11102 TANGNTL BX SKIN SINGLE LES: CPT | Performed by: DERMATOLOGY

## 2022-05-23 RX ORDER — MOMETASONE FUROATE 1 MG/G
OINTMENT TOPICAL
Qty: 45 G | Refills: 0 | Status: SHIPPED | OUTPATIENT
Start: 2022-05-23

## 2022-05-23 NOTE — PATIENT INSTRUCTIONS
Biopsy Wound Care Instructions    · Keep the bandage in place for 24 hours. · Cleanse the wound with mild soapy water daily   Gently dry the area.  Apply Vaseline or petroleum jelly to the wound using a cotton tipped applicator.  Cover with a clean bandage.  Repeat this process until the biopsy site is healed.  If you had stitches placed, continue treating the site until the stitches are removed. Remember to make an appointment to return to have your stitches removed by our staff.  You may shower and bathe as usual.       ** Biopsy results generally take around 7 business days to come back. If you have not heard from us by then, please call the office at [phone]. *Please note that biopsy results are released to both the patient and physician at the same time in [address] Please allow time for your physician to review the results. One of our staff members will reach out to you with the results and plan.

## 2022-05-23 NOTE — PROGRESS NOTES
[facility]  Carmina Leon MD  [phone]      Emerson Leon  1950    66 y.o. male     Date of Visit: 5/23/2022    Last seen: 3-2021    Chief Complaint: f/u rash, lesions  Chief Complaint   Patient presents with    Skin Exam     FSE       History of Present Illness:  *new house almost done 2022    1. F/u for facial dermatitis - had been flaring in early 2019 despite TAC cream.  eucrisa helped some and then tried mometasone 0.1 oint (wife's old rx) with clearance. Has only needed a few applications intermittently in the past few years. Previous hx: 10+ year hx of pruritic eruption on the face, starting in 2006, waxing/waning in nature, c/w dermatitis at initial visit in 2017 and improved/cleared with use of TAC. Was extremely happy with improvement. Had very few mild flares since 2017  - clears within 1-2 days with TAC oint. One more significant flare in 2019 as above - cleared with mometasone. Had seen multiple providers prior to seeing me and c/o that he had no improvement:  Thought to initially be rhus dermatitis 10 years ago and was given a topical medication. He subsequently was diagnosed with acne (Dr. Vincent Goodell), hives (Dr. Damion Dillard), seborrheic dermatitis (Wily Mccray) and [facility] most recently by Dr. Pepper Becerra at [facility]. Has tried metrocream, elidel, desonide, protopic w/o significant improvement and ketoconazole (made it worse). No eruption elsewhere. 2. S/p bx of concerning brown lesion on the L temple in 2017. Benign pathology - solar lentigo. Still present and looks concerning - irregular. Asx. 3. No other changing or concerning brown lesions/moles. 4.  He has a few new rough spots - FH/frontal scalp and ear. Review of Systems:  Gen: Feels well, good sense of health. Skin: No changing moles or lesions. Past Medical History, Family History, Surgical History, Medications and Allergies reviewed.     Outpatient Medications Marked as Taking for the 5/23/22 encounter (Office Visit) with Ashley Steven MD   Medication Sig Dispense Refill    Cholecalciferol (VITAMIN D) 50 MCG (2000 UT) CAPS capsule Take by mouth      allopurinol (ZYLOPRIM) 300 MG tablet TAKE 1 TABLET BY MOUTH EVERY DAY      mometasone (ELOCON) 0.1 % ointment Apply topically daily. 45 g 0    omeprazole (PRILOSEC) 20 MG delayed release capsule Take 20 mg by mouth daily      aspirin 81 MG EC tablet Take 81 mg by mouth daily. No Known Allergies    Past Medical History:   Diagnosis Date    Arthritis     Gout     Kidney stones      Past Surgical History:   Procedure Laterality Date    JOINT REPLACEMENT      knee replacement    KNEE ARTHROSCOPY  1986    TOTAL KNEE ARTHROPLASTY  2006       Physical Examination     Gen, well-appearing  FSE today    L helix and frontal scalp/FH + vertex with erythematous roughly scaled macules  Subtle finely scaled patch on the R lower cheek  L temple with tan/brown mottled macule/patch  Trunk, arms with scattered brown macules and papules            Assessment and Plan     1. Dermatitis - chronic, lower medial cheeks, most c/w seb derm or irritant dermatitis - minimal  - mometasone daily - bid short-term prn flares; ed se/misuse    2. L temple - r.o LM  - Shave biopsy performed after verbal consent obtained. Patient educated regarding risk of bleeding, infection, scar and educated on wound care. Skin cleansed with alcohol pad and site anesthetized with lido + epi. Aluminum chloride applied to site for hemostasis. Petrolatum ointment and bandage applied. Specimen bottle labeled with patient information and site and specimen sent to dermpath. 3. Benign-appearing nevi and SK's   - educ re si/sx/ABCD's of MM   educ sun protection - OTC sunscreen with SPF 30-50+ recommended and reviewed usage  encouraged skin check yearly (sooner if indicated), self checks    4.  AK's - frontal scalp/upper FH, L helix  - 8 lesion(s) on the above areas treated with liquid nitrogen x 2 cycles. Patient educated on risk of blister, hypopigmentation/scar and wound care. F/u 12 mos.

## 2022-05-25 LAB — DERMATOLOGY PATHOLOGY REPORT: NORMAL

## 2022-11-22 NOTE — PROGRESS NOTES
[facility] ENDOSCOPY COLONOSCOPY PRE-OPERATIVE INSTRUCTIONS    Procedure date_11/30/2022________Arrival time_0700___________        Surgery time__0800__________       Clear liquids the day before the procedure. Do not eat or drink anything within 5 hours of your procedure. This includes water chewing gum, mints and ice chips. You may brush your teeth and gargle the morning of your surgery, but do not swallow the water    You may be asked to stop blood thinners such as Coumadin, Plavix, Fragmin, Lovenox, etc., or any anti-inflammatories such as:  Aspirin, Ibuprofen, Advil, Naproxen prior to your procedure. We also ask that you stop any OTC medications such as fish oil, vitamin E, glucosamine, garlic, Multivitamins, COQ 10, etc.    You must make arrangements for a responsible adult to arrive with you and stay in our waiting area during your procedure. They will also need to take you home after your procedure. For your safety you will not be allowed to leave alone or drive yourself home. Also for your safety, it is strongly suggested that someone stay with you the first 24 hours after your procedure. For your comfort, please wear simple loose fitting clothing to the center. Please do not bring valuables. If you have a living will and a durable power of  for healthcare, please bring in a copy.      You will need to bring a photo ID and insurance card    Our goal is to provide you with excellent care so if you have any questions, please contact us at the [facility] at [phone]         Please note these are generalized instructions for all colonoscopy cases, you may be provided with more specific instructions if necessary

## 2022-11-29 ENCOUNTER — ANESTHESIA EVENT (OUTPATIENT)
Dept: ENDOSCOPY | Age: 72
End: 2022-11-29
Payer: MEDICARE

## 2022-11-30 ENCOUNTER — ANESTHESIA (OUTPATIENT)
Dept: ENDOSCOPY | Age: 72
End: 2022-11-30
Payer: MEDICARE

## 2022-11-30 ENCOUNTER — HOSPITAL ENCOUNTER (OUTPATIENT)
Age: 72
Setting detail: OUTPATIENT SURGERY
Discharge: HOME | End: 2022-11-30
Attending: INTERNAL MEDICINE | Admitting: INTERNAL MEDICINE
Payer: MEDICARE

## 2022-11-30 VITALS
SYSTOLIC BLOOD PRESSURE: 111 MMHG | OXYGEN SATURATION: 96 % | RESPIRATION RATE: 17 BRPM | BODY MASS INDEX: 27.97 KG/M2 | TEMPERATURE: 97 F | HEIGHT: 66 IN | HEART RATE: 84 BPM | WEIGHT: 174 LBS | DIASTOLIC BLOOD PRESSURE: 63 MMHG

## 2022-11-30 DIAGNOSIS — Z12.11 SCREEN FOR COLON CANCER: ICD-10-CM

## 2022-11-30 PROCEDURE — 88305 TISSUE EXAM BY PATHOLOGIST: CPT

## 2022-11-30 PROCEDURE — 7100000010 HC PHASE II RECOVERY - FIRST 15 MIN: Performed by: INTERNAL MEDICINE

## 2022-11-30 PROCEDURE — 7100000011 HC PHASE II RECOVERY - ADDTL 15 MIN: Performed by: INTERNAL MEDICINE

## 2022-11-30 PROCEDURE — 2580000003 HC RX 258: Performed by: ANESTHESIOLOGY

## 2022-11-30 PROCEDURE — 3700000001 HC ADD 15 MINUTES (ANESTHESIA): Performed by: INTERNAL MEDICINE

## 2022-11-30 PROCEDURE — 6360000002 HC RX W HCPCS

## 2022-11-30 PROCEDURE — 2500000003 HC RX 250 WO HCPCS

## 2022-11-30 PROCEDURE — 3700000000 HC ANESTHESIA ATTENDED CARE: Performed by: INTERNAL MEDICINE

## 2022-11-30 PROCEDURE — 3609010600 HC COLONOSCOPY POLYPECTOMY SNARE/COLD BIOPSY: Performed by: INTERNAL MEDICINE

## 2022-11-30 PROCEDURE — 2709999900 HC NON-CHARGEABLE SUPPLY: Performed by: INTERNAL MEDICINE

## 2022-11-30 RX ORDER — PROPOFOL 10 MG/ML
INJECTION, EMULSION INTRAVENOUS PRN
Status: DISCONTINUED | OUTPATIENT
Start: 2022-11-30 | End: 2022-11-30 | Stop reason: SDUPTHER

## 2022-11-30 RX ORDER — ONDANSETRON 2 MG/ML
4 INJECTION INTRAMUSCULAR; INTRAVENOUS
Status: CANCELLED | OUTPATIENT
Start: 2022-11-30 | End: 2022-12-01

## 2022-11-30 RX ORDER — SODIUM CHLORIDE 0.9 % (FLUSH) 0.9 %
5-40 SYRINGE (ML) INJECTION EVERY 12 HOURS SCHEDULED
Status: DISCONTINUED | OUTPATIENT
Start: 2022-11-30 | End: 2022-11-30 | Stop reason: HOSPADM

## 2022-11-30 RX ORDER — LIDOCAINE HYDROCHLORIDE 20 MG/ML
INJECTION, SOLUTION EPIDURAL; INFILTRATION; INTRACAUDAL; PERINEURAL PRN
Status: DISCONTINUED | OUTPATIENT
Start: 2022-11-30 | End: 2022-11-30 | Stop reason: SDUPTHER

## 2022-11-30 RX ORDER — SODIUM CHLORIDE 0.9 % (FLUSH) 0.9 %
5-40 SYRINGE (ML) INJECTION PRN
Status: CANCELLED | OUTPATIENT
Start: 2022-11-30

## 2022-11-30 RX ORDER — SODIUM CHLORIDE 9 MG/ML
INJECTION, SOLUTION INTRAVENOUS PRN
Status: CANCELLED | OUTPATIENT
Start: 2022-11-30

## 2022-11-30 RX ORDER — SODIUM CHLORIDE 0.9 % (FLUSH) 0.9 %
5-40 SYRINGE (ML) INJECTION EVERY 12 HOURS SCHEDULED
Status: CANCELLED | OUTPATIENT
Start: 2022-11-30

## 2022-11-30 RX ORDER — SODIUM CHLORIDE 9 MG/ML
INJECTION, SOLUTION INTRAVENOUS PRN
Status: DISCONTINUED | OUTPATIENT
Start: 2022-11-30 | End: 2022-11-30 | Stop reason: HOSPADM

## 2022-11-30 RX ORDER — SODIUM CHLORIDE 0.9 % (FLUSH) 0.9 %
5-40 SYRINGE (ML) INJECTION PRN
Status: DISCONTINUED | OUTPATIENT
Start: 2022-11-30 | End: 2022-11-30 | Stop reason: HOSPADM

## 2022-11-30 RX ORDER — GLYCOPYRROLATE 0.2 MG/ML
INJECTION INTRAMUSCULAR; INTRAVENOUS PRN
Status: DISCONTINUED | OUTPATIENT
Start: 2022-11-30 | End: 2022-11-30 | Stop reason: SDUPTHER

## 2022-11-30 RX ADMIN — LIDOCAINE HYDROCHLORIDE 80 MG: 20 INJECTION, SOLUTION EPIDURAL; INFILTRATION; INTRACAUDAL; PERINEURAL at 07:56

## 2022-11-30 RX ADMIN — PROPOFOL 20 MG: 10 INJECTION, EMULSION INTRAVENOUS at 08:09

## 2022-11-30 RX ADMIN — SODIUM CHLORIDE: 9 INJECTION, SOLUTION INTRAVENOUS at 07:11

## 2022-11-30 RX ADMIN — PROPOFOL 20 MG: 10 INJECTION, EMULSION INTRAVENOUS at 08:00

## 2022-11-30 RX ADMIN — PROPOFOL 20 MG: 10 INJECTION, EMULSION INTRAVENOUS at 08:07

## 2022-11-30 RX ADMIN — PROPOFOL 20 MG: 10 INJECTION, EMULSION INTRAVENOUS at 07:58

## 2022-11-30 RX ADMIN — GLYCOPYRROLATE 0.2 MG: 0.2 INJECTION, SOLUTION INTRAMUSCULAR; INTRAVENOUS at 07:52

## 2022-11-30 RX ADMIN — PROPOFOL 30 MG: 10 INJECTION, EMULSION INTRAVENOUS at 08:03

## 2022-11-30 RX ADMIN — PROPOFOL 20 MG: 10 INJECTION, EMULSION INTRAVENOUS at 08:11

## 2022-11-30 RX ADMIN — PROPOFOL 20 MG: 10 INJECTION, EMULSION INTRAVENOUS at 08:05

## 2022-11-30 RX ADMIN — PROPOFOL 100 MG: 10 INJECTION, EMULSION INTRAVENOUS at 07:56

## 2022-11-30 ASSESSMENT — PAIN - FUNCTIONAL ASSESSMENT: PAIN_FUNCTIONAL_ASSESSMENT: NONE - DENIES PAIN

## 2022-11-30 NOTE — ANESTHESIA PRE PROCEDURE
[facility] Department of Anesthesiology  Pre-Anesthesia Evaluation/Consultation       Name:  Edward Philip  : 1950  Age:  74 y.o. MRN:  [id number]  Date: 2022           Surgeon: Surgeon(s):  Cameron Basurto MD    Procedure: Procedure(s):  COLORECTAL CANCER SCREENING, NOT HIGH RISK     No Known Allergies  Patient Active Problem List   Diagnosis    Primary localized osteoarthrosis, lower leg    DDD (degenerative disc disease), cervical    DDD (degenerative disc disease), thoracic    Cervical radicular pain    History of total knee replacement    Right hip pain    Primary osteoarthritis of right knee    Sepsis ([facility] [address]     Past Medical History:   Diagnosis Date    Arthritis     Gout     Kidney stones      Past Surgical History:   Procedure Laterality Date    JOINT REPLACEMENT Bilateral     knee replacement    KNEE ARTHROSCOPY  1986    SHOULDER SURGERY Right     TOTAL KNEE ARTHROPLASTY  2006     Social History     Tobacco Use    Smoking status: Never    Smokeless tobacco: Never   Vaping Use    Vaping Use: Never used   Substance Use Topics    Alcohol use: No    Drug use: No     Medications  No current facility-administered medications on file prior to encounter. Current Outpatient Medications on File Prior to Encounter   Medication Sig Dispense Refill    sildenafil (REVATIO) 20 MG tablet TAKE 1 TO 5 TABLETS BY MOUTH ONCE DAILY AS NEEDED      Cholecalciferol (VITAMIN D) 50 MCG ( UT) CAPS capsule Take by mouth      allopurinol (ZYLOPRIM) 300 MG tablet Takes 1/2 tablet      omeprazole (PRILOSEC) 20 MG delayed release capsule Take 20 mg by mouth daily      aspirin 81 MG EC tablet Take 81 mg by mouth daily.        Current Facility-Administered Medications   Medication Dose Route Frequency Provider Last Rate Last Admin    sodium chloride flush 0.9 % injection 5-40 mL  5-40 mL IntraVENous 2 times per day Suzan Evangelista MD  sodium chloride flush 0.9 % injection 5-40 mL  5-40 mL IntraVENous PRN Bam Barclay MD        0.9 % sodium chloride infusion   IntraVENous PRN Bam Barclay  mL/hr at 22 07 New Bag at 22 07     Vital Signs (Current)   Vitals:    22   BP: (!) 154/87   Pulse: 53   Resp: 16   Temp: 97.5 °F (36.4 °C)   SpO2: 99%     Vital Signs Statistics (for past 48 hrs)     Temp  Av.5 °F (36.4 °C)  Min: 97.5 °F (36.4 °C)   Min taken time: 22  Max: 97.5 °F (36.4 °C)   Max taken time: 22  Pulse  Av  Min: 48   Min taken time: 22  Max: 48   Max taken time: 22  Resp  Av  Min: 12   Min taken time: 22  Max: 12   Max taken time: 22  BP  Min: 154/87   Min taken time: 22  Max: 154/87   Max taken time: 22  SpO2  Av %  Min: 99 %   Min taken time: 22  Max: 99 %   Max taken time: 22    BP Readings from Last 3 Encounters:   22 (!) 154/87   21 132/80   21 117/66     BMI  Body mass index is 28.08 kg/m². Estimated body mass index is 28.08 kg/m² as calculated from the following:    Height as of this encounter: 5' 6\" (1.676 m). Weight as of this encounter: 174 lb (78.9 kg).     CBC   Lab Results   Component Value Date/Time    WBC 17.4 2021 05:30 AM    RBC 4.27 2021 05:30 AM    HGB 14.1 2021 05:30 AM    HCT 41.5 2021 05:30 AM    MCV 97.3 2021 05:30 AM    RDW 13.3 2021 05:30 AM     2021 05:30 AM     CMP    Lab Results   Component Value Date/Time     2021 05:30 AM    K 4.5 2021 05:30 AM     2021 05:30 AM    CO2 25 2021 05:30 AM    BUN 19 2021 05:30 AM    CREATININE 1.5 2021 05:30 AM    GFRAA 56 2021 05:30 AM    GFRAA >60 2013 03:45 AM    AGRATIO 1.6 2021 11:53 AM    LABGLOM 46 2021 05:30 AM    GLUCOSE 119 2021 05:30 AM    PROT 7.1 06/27/2021 11:53 AM    PROT 7.1 10/03/2011 03:06 PM    CALCIUM 8.9 06/28/2021 05:30 AM    BILITOT 0.9 06/27/2021 11:53 AM    ALKPHOS 72 06/27/2021 11:53 AM    AST 35 06/27/2021 11:53 AM    ALT 23 06/27/2021 11:53 AM     BMP    Lab Results   Component Value Date/Time     06/28/2021 05:30 AM    K 4.5 06/28/2021 05:30 AM     06/28/2021 05:30 AM    CO2 25 06/28/2021 05:30 AM    BUN 19 06/28/2021 05:30 AM    CREATININE 1.5 06/28/2021 05:30 AM    CALCIUM 8.9 06/28/2021 05:30 AM    GFRAA 56 06/28/2021 05:30 AM    GFRAA >60 04/07/2013 03:45 AM    LABGLOM 46 06/28/2021 05:30 AM    GLUCOSE 119 06/28/2021 05:30 AM     POCGlucose  No results for input(s): GLUCOSE in the last 72 hours. Coags  No results found for: PROTIME, INR, APTT  HCG (If Applicable) No results found for: PREGTESTUR, PREGSERUM, HCG, HCGQUANT   ABGs No results found for: PHART, PO2ART, QEU8KOT, PBU7IJQ, BEART, W9FWJGBA   Type & Screen (If Applicable)  No results found for: LABABO, LABRH                         BMI: Wt Readings from Last 3 Encounters:       NPO Status:   Date of last liquid consumption: 11/30/22   Time of last liquid consumption: 0200   Date of last solid food consumption: 11/29/22      Time of last solid consumption: 2100       Anesthesia Evaluation  Patient summary reviewed no history of anesthetic complications:   Airway: Mallampati: III  TM distance: >3 FB   Neck ROM: full  Mouth opening: > = 3 FB   Dental:    (+) partials      Pulmonary:Negative Pulmonary ROS and normal exam                               Cardiovascular:Negative CV ROS  Exercise tolerance: good (>4 METS),         ECG reviewed  Rhythm: regular  Rate: normal           Beta Blocker:  Not on Beta Blocker         Neuro/Psych:   Negative Neuro/Psych ROS              GI/Hepatic/Renal:   (+) GERD: well controlled, bowel prep,           Endo/Other: Negative Endo/Other ROS                    Abdominal:             Vascular: negative vascular ROS.          Other Findings: Anesthesia Plan      MAC     ASA 3       Induction: intravenous. Anesthetic plan and risks discussed with patient. Plan discussed with CRNA. This pre-anesthesia assessment may be used as a history and physical.    DOS STAFF ADDENDUM:    Pt seen and examined, chart reviewed (including anesthesia, drug and allergy history). No interval changes to history and physical examination. Anesthetic plan, risks, benefits, alternatives, and personnel involved discussed with patient. Questions and concerns addressed. Patient(family) verbalized an understanding and agrees to proceed.       Rk Calle MD  November 30, 2022  7:12 AM

## 2022-11-30 NOTE — ANESTHESIA POSTPROCEDURE EVALUATION
[facility] Department of Anesthesiology  Post-Anesthesia Note       Name:  Edward Philip                                  Age:  74 y.o. MRN:  [id number]     Last Vitals & Oxygen Saturation: /63   Pulse 84   Temp 97 °F (36.1 °C) (Temporal)   Resp 17   Ht 5' 6\" (1.676 m)   Wt 174 lb (78.9 kg)   SpO2 96%   BMI 28.08 kg/m²   Patient Vitals for the past 4 hrs:   BP Temp Temp src Pulse Resp SpO2 Height Weight   11/30/22 0826 111/63 97 °F (36.1 °C) Temporal 84 17 96 % -- --   11/30/22 0818 98/70 97 °F (36.1 °C) Temporal 85 16 95 % -- --   11/30/22 0816 (!) 97/59 97 °F (36.1 °C) Temporal 73 17 96 % -- --   11/30/22 0707 (!) 154/87 97.5 °F (36.4 °C) Temporal 53 16 99 % 5' 6\" (1.676 m) 174 lb (78.9 kg)       Level of consciousness:  Awake, alert    Respiratory: Respirations easy, no distress. Stable. Cardiovascular: Hemodynamically stable. Hydration: Adequate. PONV: Adequately managed. Post-op pain: Adequately controlled. Post-op assessment: Tolerated anesthetic well without complication. Complications:  None.     Danyelle Scott MD  November 30, 2022   8:56 AM

## 2022-11-30 NOTE — H&P
Gastroenterology Outpatient History and Physical     Patient: Hollis Nick MRN: [id number] Sex: male   YOB: 1950 Age: 74 y.o. Location: [address] [place] [address]    Date:11/30/2022  Primary Care Physician: Colleen Braga MD         Patient: Hollis Nick    Physician: Elvin Roberts MD    History of Present Illness: Colon cancer screening. History of colon polyps  Review of Systems:  Weight Loss: No  Dysphagia: No  Dyspepsia: No  History:  Past Medical History:   Diagnosis Date    Arthritis     Gout     Kidney stones       Past Surgical History:   Procedure Laterality Date    JOINT REPLACEMENT Bilateral     knee replacement    KNEE ARTHROSCOPY  01/01/1986    SHOULDER SURGERY Right     TOTAL KNEE ARTHROPLASTY  01/01/2006      Social History     Socioeconomic History    Marital status:      Spouse name: None    Number of children: None    Years of education: None    Highest education level: None   Occupational History    Occupation: ret   Tobacco Use    Smoking status: Never    Smokeless tobacco: Never   Vaping Use    Vaping Use: Never used   Substance and Sexual Activity    Alcohol use: No    Drug use: No      Family History   Problem Relation Age of Onset    Heart Failure Father         heart disease    Cancer Brother     Hypertension Brother     Hypertension Brother     Stroke Brother      Allergies: No Known Allergies  Medications:   Prior to Admission medications    Medication Sig Start Date End Date Taking?  Authorizing Provider   sildenafil (REVATIO) 20 MG tablet TAKE 1 TO 5 TABLETS BY MOUTH ONCE DAILY AS NEEDED 5/15/21   Historical Provider, MD   Cholecalciferol (VITAMIN D) 50 MCG (2000 UT) CAPS capsule Take by mouth    Historical Provider, MD   allopurinol (ZYLOPRIM) 300 MG tablet Takes 1/2 tablet 2/16/20   Historical Provider, MD   omeprazole (PRILOSEC) 20 MG delayed release capsule Take 20 mg by mouth daily    Historical Provider, MD   aspirin 81 MG EC tablet Take 81 mg by mouth daily. Historical Provider, MD       Vital Signs: BP (!) 154/87   Pulse 53   Temp 97.5 °F (36.4 °C) (Temporal)   Resp 16   Ht 5' 6\" (1.676 m)   Wt 174 lb (78.9 kg)   SpO2 99%   BMI 28.08 kg/m²   Physical Exam:   Heart: regular   Lungs: non-labored breathing  Mental status:  Alert and oriented    ASA score:  ASA 2 - Patient with mild systemic disease with no functional limitations{  Mallimpati score:  2     Planned Procedure: Colonoscopy    Planned Sedation: Monitored anesthesia.     Signed By: Hair Weiner MD   November 30, 2022

## 2022-11-30 NOTE — OP NOTE
COLONOSCOPY     Patient: Bernice Leger MRN: [id number]   YOB: 1950 Age: 74 y.o. Sex: male       Admitting Physician: Phil Tello     Primary Care Physician: Vincent Matute MD      DATE OF PROCEDURE: 11/30/2022  PROCEDURE: Colonoscopy    PREOPERATIVE DIAGNOSIS: Screen for colon cancer  HPI: This is a 74y.o. year old male who presents today for colon cancer screening and screening colonoscopy. The patient has a history of colon polyps, last colonoscopy 2015. ENDOSCOPIST: Luke Lozoya MD    POSTOPERATIVE DIAGNOSIS:    1.  Small ascending colon polyp, cold snared and removed  2. Small transverse colon polyp, cold snared and removed  3. Diverticulosis    PLAN:   1. Follow-up biopsy; the patient to contact me in 1 week for results  2. Tentative surveillance colonoscopy in 7 years, health permitting    INFORMED CONSENT:  Informed consent for colonoscopy was obtained. The benefits and risks including adverse medicine reaction and perforation have been explained. The patient's questions were answered and the patient agreed to proceed. ASA: ASA 2 - Patient with mild systemic disease with no functional limitations     SEDATION: MAC    The patient's vital signs, cardiac status, pulmonary status, abdominal status and mental status were stable for the procedure. The patient's vital signs and respiratory function as monitored by oxygen saturation remained stable. COLON PREPARATION:  The patient was given a split colon preparation and the preparation was adequate. Procedure Details: An anal exam was performed and this was unremarkable. A digital rectal exam was performed and no masses palpated. The Olympus videocolonoscope  was inserted in the rectum and carefully advanced to the cecum as identified by IC valve, crow's foot appearance and appendix. The cecum was photodocumented.   The colonoscope was slowly withdrawn and retrograde examination of the colon was carefully performed with inspection around and between folds. The ascending colon and cecum were intubated twice with repeat antegrade and retrograde examination. Retroflexion in the rectum was performed. Cecum Intubated: Yes    Findings: There was a 3 to 4 mm sessile polyp in the ascending colon that was cold snared and removed. There was a 2 mm flat polyp in the descending colon that was cold snared and removed. There are diverticula in the sigmoid colon with diverticula scattered throughout. There are no other significant polyps or tumors.     Estimated Blood Loss:  Minimal  Complications: None    Signed By: Rosa Mccrary MD

## 2022-11-30 NOTE — DISCHARGE INSTRUCTIONS

## 2022-12-04 NOTE — RESULT ENCOUNTER NOTE
Colonoscopy done November 30 for polyp surveillance showed a small ascending colon adenoma in the normal tissue transverse colon polyp. Also seen on the examination was diverticulosis. I recommend a surveillance colonoscopy in 7 years, health permitting.

## 2023-05-25 ENCOUNTER — OFFICE VISIT (OUTPATIENT)
Dept: DERMATOLOGY | Age: 73
End: 2023-05-25
Payer: MEDICARE

## 2023-05-25 DIAGNOSIS — L72.0 EPIDERMOID CYST: ICD-10-CM

## 2023-05-25 DIAGNOSIS — L82.1 SEBORRHEIC KERATOSES: ICD-10-CM

## 2023-05-25 DIAGNOSIS — L30.9 DERMATITIS: Primary | ICD-10-CM

## 2023-05-25 DIAGNOSIS — M79.89 FINGER SWELLING: ICD-10-CM

## 2023-05-25 DIAGNOSIS — D22.9 MULTIPLE NEVI: ICD-10-CM

## 2023-05-25 DIAGNOSIS — L57.0 AK (ACTINIC KERATOSIS): ICD-10-CM

## 2023-05-25 DIAGNOSIS — L81.4 LENTIGINES: ICD-10-CM

## 2023-05-25 PROCEDURE — 17003 DESTRUCT PREMALG LES 2-14: CPT | Performed by: DERMATOLOGY

## 2023-05-25 PROCEDURE — 1123F ACP DISCUSS/DSCN MKR DOCD: CPT | Performed by: DERMATOLOGY

## 2023-05-25 PROCEDURE — 17000 DESTRUCT PREMALG LESION: CPT | Performed by: DERMATOLOGY

## 2023-05-25 PROCEDURE — 99214 OFFICE O/P EST MOD 30 MIN: CPT | Performed by: DERMATOLOGY

## 2023-05-25 RX ORDER — MINOCYCLINE HYDROCHLORIDE 50 MG/1
CAPSULE ORAL
Qty: 20 CAPSULE | Refills: 0 | Status: SHIPPED | OUTPATIENT
Start: 2023-05-25

## 2023-07-27 ENCOUNTER — PROCEDURE VISIT (OUTPATIENT)
Dept: DERMATOLOGY | Age: 73
End: 2023-07-27
Payer: MEDICARE

## 2023-07-27 DIAGNOSIS — L72.0 EPIDERMOID CYST: Primary | ICD-10-CM

## 2023-07-27 PROCEDURE — 12032 INTMD RPR S/A/T/EXT 2.6-7.5: CPT | Performed by: DERMATOLOGY

## 2023-07-27 PROCEDURE — 11402 EXC TR-EXT B9+MARG 1.1-2 CM: CPT | Performed by: DERMATOLOGY

## 2023-07-27 NOTE — PROGRESS NOTES
[facility]  MD Prakash Mason S Louis Ellsworthchandrakant  1950    74 y.o. male     Date of Visit: 7/27/2023    Last seen: 5-2023    Chief Complaint: excision  Chief Complaint   Patient presents with    Procedure     Excision: Left Extensor Forearm      History of Present Illness:  *new house done 2022    Here for excision of cystic papule on the R extensor FA - bothersome (getting bumped and gets irritated) and desires removal.    No blood thinners except ASA  No pacemaker or defibrillator. Review of Systems:  Gen: Feels well, good sense of health. Skin: No changing moles or lesions. Past Medical History, Family History, Surgical History, Medications and Allergies reviewed. Outpatient Medications Marked as Taking for the 7/27/23 encounter (Procedure visit) with Meryle Song, MD   Medication Sig Dispense Refill    minocycline (MINOCIN) 50 MG capsule One po bid with food.  20 capsule 0    sildenafil (REVATIO) 20 MG tablet TAKE 1 TO 5 TABLETS BY MOUTH ONCE DAILY AS NEEDED      Cholecalciferol (VITAMIN D) 50 MCG (2000 UT) CAPS capsule Take by mouth      allopurinol (ZYLOPRIM) 300 MG tablet Takes 1/2 tablet      omeprazole (PRILOSEC) 20 MG delayed release capsule Take 1 capsule by mouth daily      aspirin 81 MG EC tablet Take 1 tablet by mouth daily       No Known Allergies    Past Medical History:   Diagnosis Date    Arthritis     Gout     Kidney stones      Past Surgical History:   Procedure Laterality Date    COLONOSCOPY N/A 11/30/2022    COLONOSCOPY POLYPECTOMY SNARE/COLD BIOPSY performed by Favio Huffman MD at [facility] Bilateral     knee replacement    KNEE ARTHROSCOPY  01/01/1986    SHOULDER SURGERY Right     TOTAL KNEE ARTHROPLASTY  01/01/2006       Physical Examination       R extensor FA with ~ 1 cm cystic faintly erythematous mobile nodule      Assessment and Plan     R FA - irritated epidermoid cyst  - excision today

## 2023-08-01 LAB — DERMATOLOGY PATHOLOGY REPORT: NORMAL

## 2024-02-02 NOTE — PROGRESS NOTES
[facility] ENDOSCOPY COLONOSCOPY PRE-OPERATIVE INSTRUCTIONS    Procedure date__2/8/2024_______  Arrival time___1000_________          Surgery time___1100_________       Clear liquids the day before the procedure. Do not eat or drink anything within 5 hours of your procedure.    This includes water chewing gum, mints and ice chips.   You may brush your teeth and gargle the morning of your surgery, but do not swallow the water    You may be asked to stop blood thinners such as Coumadin, Plavix, Fragmin, Lovenox, etc., or any anti-inflammatories such as:  Aspirin, Ibuprofen, Advil, Naproxen prior to your procedure.   We also ask that you stop any OTC medications such as fish oil, vitamin E, glucosamine, garlic, Multivitamins, COQ 10, etc.    You must make arrangements for a responsible adult to arrive with you and stay in our waiting area during your procedure.  They will also need to take you home after your procedure.    For your safety you will not be allowed to leave alone or drive yourself home.    Also for your safety, it is strongly suggested that someone stay with you the first 24 hours after your procedure.    For your comfort, please wear simple loose fitting clothing to the center.  Please do not bring valuables.      If you have a living will and a durable power of  for healthcare, please bring in a copy.     You will need to bring a photo ID and insurance card    Our goal is to provide you with excellent care so if you have any questions, please contact us at the [facility] at [phone]         Please note these are generalized instructions for all colonoscopy cases, you may be provided with more specific instructions if necessary

## 2024-02-06 ENCOUNTER — ANESTHESIA EVENT (OUTPATIENT)
Dept: ENDOSCOPY | Age: 74
End: 2024-02-06
Payer: MEDICARE

## 2024-02-08 ENCOUNTER — ANESTHESIA (OUTPATIENT)
Dept: ENDOSCOPY | Age: 74
End: 2024-02-08
Payer: MEDICARE

## 2024-02-08 ENCOUNTER — HOSPITAL ENCOUNTER (OUTPATIENT)
Age: 74
Setting detail: OUTPATIENT SURGERY
Discharge: HOME | End: 2024-02-08
Attending: INTERNAL MEDICINE | Admitting: INTERNAL MEDICINE
Payer: MEDICARE

## 2024-02-08 VITALS
TEMPERATURE: 97 F | WEIGHT: 166.2 LBS | DIASTOLIC BLOOD PRESSURE: 70 MMHG | HEIGHT: 66 IN | SYSTOLIC BLOOD PRESSURE: 115 MMHG | OXYGEN SATURATION: 96 % | RESPIRATION RATE: 16 BRPM | BODY MASS INDEX: 26.71 KG/M2 | HEART RATE: 53 BPM

## 2024-02-08 DIAGNOSIS — K62.5 RECTAL BLEEDING: ICD-10-CM

## 2024-02-08 PROCEDURE — 7100000010 HC PHASE II RECOVERY - FIRST 15 MIN: Performed by: INTERNAL MEDICINE

## 2024-02-08 PROCEDURE — 3609010600 HC COLONOSCOPY POLYPECTOMY SNARE/COLD BIOPSY: Performed by: INTERNAL MEDICINE

## 2024-02-08 PROCEDURE — 3700000000 HC ANESTHESIA ATTENDED CARE: Performed by: INTERNAL MEDICINE

## 2024-02-08 PROCEDURE — 2500000003 HC RX 250 WO HCPCS: Performed by: NURSE ANESTHETIST, CERTIFIED REGISTERED

## 2024-02-08 PROCEDURE — 3700000001 HC ADD 15 MINUTES (ANESTHESIA): Performed by: INTERNAL MEDICINE

## 2024-02-08 PROCEDURE — 7100000011 HC PHASE II RECOVERY - ADDTL 15 MIN: Performed by: INTERNAL MEDICINE

## 2024-02-08 PROCEDURE — 6360000002 HC RX W HCPCS: Performed by: NURSE ANESTHETIST, CERTIFIED REGISTERED

## 2024-02-08 PROCEDURE — 2709999900 HC NON-CHARGEABLE SUPPLY: Performed by: INTERNAL MEDICINE

## 2024-02-08 PROCEDURE — 88305 TISSUE EXAM BY PATHOLOGIST: CPT

## 2024-02-08 PROCEDURE — 2580000003 HC RX 258: Performed by: STUDENT IN AN ORGANIZED HEALTH CARE EDUCATION/TRAINING PROGRAM

## 2024-02-08 RX ORDER — SODIUM CHLORIDE 0.9 % (FLUSH) 0.9 %
5-40 SYRINGE (ML) INJECTION EVERY 12 HOURS SCHEDULED
Status: DISCONTINUED | OUTPATIENT
Start: 2024-02-08 | End: 2024-02-08 | Stop reason: HOSPADM

## 2024-02-08 RX ORDER — SODIUM CHLORIDE 9 MG/ML
INJECTION, SOLUTION INTRAVENOUS PRN
Status: DISCONTINUED | OUTPATIENT
Start: 2024-02-08 | End: 2024-02-08 | Stop reason: HOSPADM

## 2024-02-08 RX ORDER — LIDOCAINE HYDROCHLORIDE 20 MG/ML
INJECTION, SOLUTION EPIDURAL; INFILTRATION; INTRACAUDAL; PERINEURAL PRN
Status: DISCONTINUED | OUTPATIENT
Start: 2024-02-08 | End: 2024-02-08 | Stop reason: SDUPTHER

## 2024-02-08 RX ORDER — PROPOFOL 10 MG/ML
INJECTION, EMULSION INTRAVENOUS PRN
Status: DISCONTINUED | OUTPATIENT
Start: 2024-02-08 | End: 2024-02-08 | Stop reason: SDUPTHER

## 2024-02-08 RX ORDER — SODIUM CHLORIDE 0.9 % (FLUSH) 0.9 %
5-40 SYRINGE (ML) INJECTION PRN
Status: DISCONTINUED | OUTPATIENT
Start: 2024-02-08 | End: 2024-02-08 | Stop reason: HOSPADM

## 2024-02-08 RX ADMIN — PROPOFOL 20 MG: 10 INJECTION, EMULSION INTRAVENOUS at 11:41

## 2024-02-08 RX ADMIN — LIDOCAINE HYDROCHLORIDE 60 MG: 20 INJECTION, SOLUTION EPIDURAL; INFILTRATION; INTRACAUDAL; PERINEURAL at 11:29

## 2024-02-08 RX ADMIN — PROPOFOL 20 MG: 10 INJECTION, EMULSION INTRAVENOUS at 11:47

## 2024-02-08 RX ADMIN — SODIUM CHLORIDE: 9 INJECTION, SOLUTION INTRAVENOUS at 10:35

## 2024-02-08 RX ADMIN — PROPOFOL 70 MG: 10 INJECTION, EMULSION INTRAVENOUS at 11:29

## 2024-02-08 RX ADMIN — PROPOFOL 20 MG: 10 INJECTION, EMULSION INTRAVENOUS at 11:34

## 2024-02-08 RX ADMIN — PROPOFOL 20 MG: 10 INJECTION, EMULSION INTRAVENOUS at 11:44

## 2024-02-08 RX ADMIN — PROPOFOL 20 MG: 10 INJECTION, EMULSION INTRAVENOUS at 11:42

## 2024-02-08 RX ADMIN — PROPOFOL 20 MG: 10 INJECTION, EMULSION INTRAVENOUS at 11:36

## 2024-02-08 RX ADMIN — PROPOFOL 20 MG: 10 INJECTION, EMULSION INTRAVENOUS at 11:31

## 2024-02-08 RX ADMIN — PROPOFOL 20 MG: 10 INJECTION, EMULSION INTRAVENOUS at 11:38

## 2024-02-08 RX ADMIN — PROPOFOL 20 MG: 10 INJECTION, EMULSION INTRAVENOUS at 11:33

## 2024-02-08 ASSESSMENT — PAIN - FUNCTIONAL ASSESSMENT
PAIN_FUNCTIONAL_ASSESSMENT: 0-10
PAIN_FUNCTIONAL_ASSESSMENT: 0-10

## 2024-02-08 ASSESSMENT — LIFESTYLE VARIABLES: SMOKING_STATUS: 0

## 2024-02-08 NOTE — ANESTHESIA PRE PROCEDURE
orthopnea,  CORRIGAN, weak pulses and peripheral edema    ECG reviewed  Rhythm: regular  Rate: normal           Beta Blocker:  Not on Beta Blocker      ROS comment: EKG:  Sinus tachycardia   Inferior infarct, age undetermined     Neuro/Psych:      (-) seizures and CVA           GI/Hepatic/Renal:   (+) GERD:, renal disease (+ h/o renal cell carcinoma, kidney stones; last SCr: 1.5): CRI and kidney stones, bowel prep     (-) liver disease and no morbid obesity      ROS comment: Diverticulosis of the colon on CT    Prior colonoscopy 2023  Findings:   There was a 3 to 4 mm sessile polyp in the ascending colon that was cold snared and removed. There was a 2 mm flat polyp in the descending colon that was cold snared and removed. There are diverticula in the sigmoid colon with diverticula scattered throughout.    Recently reported rectal bleeding.  .   Endo/Other:    (+) : arthritis: OA., malignancy/cancer (renal).    (-) diabetes mellitus                ROS comment: No recent labs available Abdominal:             Vascular:   + PVD, aortic or cerebral (carotid disease).       Other Findings: Well-appearing, athletic build, very pleasant. Retired [employer] teacher.             Anesthesia Plan      MAC     ASA 3     (NPO appropriate. Mr. Barun denies active nausea / reflux. Endorses adequate prep.    Small amount of blood noted with wiping, denies any known history of hemorrhoids.)        Anesthetic plan and risks discussed with patient.      Plan discussed with CRNA.            This pre-anesthesia assessment may be used as a history and physical.    DOS STAFF ADDENDUM:    Pt seen and examined, chart reviewed (including anesthesia, drug and allergy history).  No interval changes to history and physical examination.  Anesthetic plan, risks, benefits, alternatives, and personnel involved discussed with patient. Questions and concerns addressed.  Patient(family) verbalized an understanding and agrees to proceed.      Carlitos BURGESS

## 2024-02-08 NOTE — ANESTHESIA POSTPROCEDURE EVALUATION
Department of Anesthesiology  Postprocedure Note    Patient: Mark Braun  MRN: [id number]  YOB: 1950  Date of evaluation: 2/8/2024    Procedure Summary       Date: 02/08/24 Room / Location: [place] [address] / [facility]    Anesthesia Start: 1125 Anesthesia Stop: 1155    Procedure: COLONOSCOPY POLYPECTOMY SNARE/COLD BIOPSY Diagnosis:       Rectal bleeding      (Rectal bleeding [K62.5])    Surgeons: Jalen Myers MD Responsible Provider: Carlitos Boyd MD    Anesthesia Type: MAC ASA Status: 3            Anesthesia Type: MAC    Attila Phase I: Attila Score: 10    Attila Phase II:      Anesthesia Post Evaluation    Patient location during evaluation: PACU  Patient participation: complete - patient participated  Level of consciousness: awake  Airway patency: patent  Nausea & Vomiting: no nausea and no vomiting  Cardiovascular status: hemodynamically stable and blood pressure returned to baseline  Respiratory status: spontaneous ventilation, nonlabored ventilation and room air  Hydration status: stable  Comments: Uneventful MAC anesthetic. Mr. Braun was seen comfortably conversing with staff following his procedure. Appropriate for discharge home with . No acute concerns.  Pain management: adequate    No notable events documented.

## 2024-02-08 NOTE — OP NOTE
COLONOSCOPY     Patient: Mark Braun MRN: [id number]   YOB: 1950 Age: 73 y.o. Sex: male       Admitting Physician: JALEN MYERS     Primary Care Physician: Michael Hussein MD      DATE OF PROCEDURE: 2/8/2024  PROCEDURE: Colonoscopy    PREOPERATIVE DIAGNOSIS: Colon cancer screening.  Rectal bleeding [K62.5]  HPI: This is a 73 y.o. year old male who presents today for colon cancer screening and screening colonoscopy.  The patient has had rectal bleeding.    ENDOSCOPIST: Jalen Myers MD    POSTOPERATIVE DIAGNOSIS:    1.  Medium sized pedunculated erythematous distal sigmoid colon polyp, hot snared and removed  2.  Small ascending colon polyp, cold snared and removed  3.  Small transverse colon polyp, cold snared and removed  4.  Diverticulosis    PLAN:   1.  Follow-up biopsy; the patient to contact me in 1 week for results  2.  Tentative surveillance colonoscopy in 5 years, health permitting    INFORMED CONSENT:  Informed consent for colonoscopy was obtained.  The benefits and risks including adverse medicine reaction and perforation have been explained.  The patient's questions were answered and the patient agreed to proceed.    ASA: ASA 2 - Patient with mild systemic disease with no functional limitations     SEDATION: MAC    The patient's vital signs, cardiac status, pulmonary status, abdominal status and mental status were stable for the procedure. The patient's vital signs and respiratory function as monitored by oxygen saturation remained stable.    COLON PREPARATION:  The patient was given a split colon preparation and the preparation was adequate.    Procedure Details:    An anal exam was performed and this was unremarkable. A digital rectal exam was performed and no masses palpated. The Olympus videocolonoscope  was inserted in the rectum and carefully advanced to the cecum as identified by IC valve, crow's foot appearance and appendix. The cecum was photodocumented.  The

## 2024-06-19 NOTE — PROGRESS NOTES
[facility]  Page Chadwick MD  [phone]      Mark CASTILLO Aparna  1950    73 y.o. male     Date of Visit: 6/20/2024    Last seen: 5-2023 and 7-2023    Chief Complaint: f/u rash, lesions  Chief Complaint   Patient presents with    Skin Exam     History of Present Illness:  *new house done 2022; recently went to [place] [id number]. F/u for facial dermatitis - had been flaring in early 2019 despite TAC cream.  eucrisa helped some and then tried mometasone 0.1 oint (wife's old rx) with clearance.  Has only needed a few applications intermittently in the past few years.  He notes intermittent flares on the R lower cheek - asx.  Improves with trx then eventually flares again.    Previous hx: 10+ year hx of pruritic eruption on the face, starting in 2006, waxing/waning in nature, c/w dermatitis at initial visit in 2017 and improved/cleared with use of TAC.  Very happy with improvement.  Had very few mild flares since 2017  - clears within 1-2 days with TAC oint. One more significant flare in 2019 as above - cleared with mometasone.    Had seen multiple providers prior to seeing me and c/o that he had no improvement:  Thought to initially be rhus dermatitis 10 years ago and was given a topical medication.  He subsequently was diagnosed with acne (Dr. Felder), hives (Dr. Brower), seborrheic dermatitis (Jodi Avalos) and LSC most recently by Dr. Horner at .  Had tried metrocream, elidel, desonide, protopic w/o significant improvement and ketoconazole (made it worse).    No eruption elsewhere.    2, 3. No other changing or concerning brown lesions/moles.  S/p bx of concerning brown lesion on the L temple in 2017.  Benign pathology - solar lentigo.    Repeat bx done 2022 d/t change - L temple - solar lentigo - bx 5-2022.    4.  He has a few new or persistent rough spots - FH/frontal scalp.    R extensor FA - c/w epidermoid cyst - excision 2023    Review of Systems:  Gen: Feels well, good sense of

## 2024-06-20 ENCOUNTER — OFFICE VISIT (OUTPATIENT)
Dept: DERMATOLOGY | Age: 74
End: 2024-06-20
Payer: MEDICARE

## 2024-06-20 DIAGNOSIS — L81.4 LENTIGINES: ICD-10-CM

## 2024-06-20 DIAGNOSIS — L82.1 SEBORRHEIC KERATOSES: ICD-10-CM

## 2024-06-20 DIAGNOSIS — L30.9 DERMATITIS: Primary | ICD-10-CM

## 2024-06-20 DIAGNOSIS — L57.0 AK (ACTINIC KERATOSIS): ICD-10-CM

## 2024-06-20 DIAGNOSIS — D22.9 MULTIPLE NEVI: ICD-10-CM

## 2024-06-20 PROCEDURE — 17003 DESTRUCT PREMALG LES 2-14: CPT | Performed by: DERMATOLOGY

## 2024-06-20 PROCEDURE — 99213 OFFICE O/P EST LOW 20 MIN: CPT | Performed by: DERMATOLOGY

## 2024-06-20 PROCEDURE — 1123F ACP DISCUSS/DSCN MKR DOCD: CPT | Performed by: DERMATOLOGY

## 2024-06-20 PROCEDURE — 17000 DESTRUCT PREMALG LESION: CPT | Performed by: DERMATOLOGY

## 2024-08-23 ENCOUNTER — TELEPHONE (OUTPATIENT)
Dept: DERMATOLOGY | Age: 74
End: 2024-08-23

## 2024-08-28 ENCOUNTER — PATIENT MESSAGE (OUTPATIENT)
Dept: DERMATOLOGY | Age: 74
End: 2024-08-28

## 2024-08-28 NOTE — TELEPHONE ENCOUNTER
Patient will send a photo through [employer] for review.     Patient did not notice before the last month or so-the area blew up, got angry and PCP suggested to have area removed.     Patient described area today as calmed down.

## 2025-06-30 ENCOUNTER — OFFICE VISIT (OUTPATIENT)
Age: 75
End: 2025-06-30
Payer: MEDICARE

## 2025-06-30 DIAGNOSIS — D22.9 MULTIPLE NEVI: ICD-10-CM

## 2025-06-30 DIAGNOSIS — L82.1 SEBORRHEIC KERATOSES: ICD-10-CM

## 2025-06-30 DIAGNOSIS — D48.5 NEOPLASM OF UNCERTAIN BEHAVIOR OF SKIN: ICD-10-CM

## 2025-06-30 DIAGNOSIS — L30.9 DERMATITIS: Primary | ICD-10-CM

## 2025-06-30 DIAGNOSIS — L81.4 LENTIGINES: ICD-10-CM

## 2025-06-30 DIAGNOSIS — L57.0 AK (ACTINIC KERATOSIS): ICD-10-CM

## 2025-06-30 PROCEDURE — 17003 DESTRUCT PREMALG LES 2-14: CPT | Performed by: DERMATOLOGY

## 2025-06-30 PROCEDURE — 1159F MED LIST DOCD IN RCRD: CPT | Performed by: DERMATOLOGY

## 2025-06-30 PROCEDURE — 99214 OFFICE O/P EST MOD 30 MIN: CPT | Performed by: DERMATOLOGY

## 2025-06-30 PROCEDURE — 1123F ACP DISCUSS/DSCN MKR DOCD: CPT | Performed by: DERMATOLOGY

## 2025-06-30 PROCEDURE — 1160F RVW MEDS BY RX/DR IN RCRD: CPT | Performed by: DERMATOLOGY

## 2025-06-30 PROCEDURE — 17000 DESTRUCT PREMALG LESION: CPT | Performed by: DERMATOLOGY

## 2025-06-30 RX ORDER — CHLORAL HYDRATE 500 MG
CAPSULE ORAL DAILY
COMMUNITY
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
## (undated) DEVICE — SNARE ENDOSCP AD SM L240CM LOOP W1.3CM SHTH DIA2.4MM POLYP

## (undated) DEVICE — SNARE ENDOSCP POLYP 2.4 MM 240 CM 10 MM 2.8 MM CAPTIVATOR